# Patient Record
Sex: FEMALE | NOT HISPANIC OR LATINO
[De-identification: names, ages, dates, MRNs, and addresses within clinical notes are randomized per-mention and may not be internally consistent; named-entity substitution may affect disease eponyms.]

---

## 2017-02-10 ENCOUNTER — APPOINTMENT (OUTPATIENT)
Dept: BREAST CENTER | Facility: CLINIC | Age: 62
End: 2017-02-10

## 2017-02-10 VITALS
SYSTOLIC BLOOD PRESSURE: 132 MMHG | BODY MASS INDEX: 40.75 KG/M2 | WEIGHT: 230 LBS | HEART RATE: 74 BPM | DIASTOLIC BLOOD PRESSURE: 80 MMHG | HEIGHT: 63 IN

## 2017-05-10 ENCOUNTER — APPOINTMENT (OUTPATIENT)
Dept: HEMATOLOGY ONCOLOGY | Facility: CLINIC | Age: 62
End: 2017-05-10

## 2017-05-10 VITALS
SYSTOLIC BLOOD PRESSURE: 117 MMHG | WEIGHT: 232 LBS | HEIGHT: 63 IN | TEMPERATURE: 98.2 F | DIASTOLIC BLOOD PRESSURE: 84 MMHG | BODY MASS INDEX: 41.11 KG/M2 | HEART RATE: 78 BPM

## 2017-05-10 DIAGNOSIS — Z09 ENCOUNTER FOR FOLLOW-UP EXAMINATION AFTER COMPLETED TREATMENT FOR CONDITIONS OTHER THAN MALIGNANT NEOPLASM: ICD-10-CM

## 2017-05-10 DIAGNOSIS — N64.4 MASTODYNIA: ICD-10-CM

## 2017-05-10 DIAGNOSIS — I10 ESSENTIAL (PRIMARY) HYPERTENSION: ICD-10-CM

## 2017-08-11 ENCOUNTER — APPOINTMENT (OUTPATIENT)
Dept: BREAST CENTER | Facility: CLINIC | Age: 62
End: 2017-08-11
Payer: COMMERCIAL

## 2017-08-11 VITALS
HEIGHT: 63 IN | SYSTOLIC BLOOD PRESSURE: 130 MMHG | BODY MASS INDEX: 40.75 KG/M2 | WEIGHT: 230 LBS | HEART RATE: 72 BPM | DIASTOLIC BLOOD PRESSURE: 94 MMHG

## 2017-08-11 PROCEDURE — 99213 OFFICE O/P EST LOW 20 MIN: CPT

## 2017-08-11 RX ORDER — PREDNISONE 20 MG/1
20 TABLET ORAL
Qty: 10 | Refills: 0 | Status: DISCONTINUED | COMMUNITY
Start: 2017-02-26

## 2017-08-11 RX ORDER — IBUPROFEN 600 MG/1
600 TABLET, FILM COATED ORAL
Qty: 30 | Refills: 0 | Status: DISCONTINUED | COMMUNITY
Start: 2017-07-21

## 2017-08-11 RX ORDER — BUTALBITAL, ACETAMINOPHEN AND CAFFEINE 300; 50; 40 MG/1; MG/1; MG/1
50-300-40 CAPSULE ORAL
Qty: 20 | Refills: 0 | Status: DISCONTINUED | COMMUNITY
Start: 2017-06-20

## 2017-08-11 RX ORDER — CYCLOBENZAPRINE HYDROCHLORIDE 10 MG/1
10 TABLET, FILM COATED ORAL
Qty: 12 | Refills: 0 | Status: DISCONTINUED | COMMUNITY
Start: 2017-07-21

## 2017-08-11 RX ORDER — ALBUTEROL SULFATE 90 UG/1
108 (90 BASE) AEROSOL, METERED RESPIRATORY (INHALATION)
Qty: 18 | Refills: 0 | Status: ACTIVE | COMMUNITY
Start: 2017-02-26

## 2017-08-11 RX ORDER — BENZONATATE 100 MG/1
100 CAPSULE ORAL
Qty: 30 | Refills: 0 | Status: DISCONTINUED | COMMUNITY
Start: 2017-02-26

## 2017-11-15 ENCOUNTER — APPOINTMENT (OUTPATIENT)
Dept: HEMATOLOGY ONCOLOGY | Facility: CLINIC | Age: 62
End: 2017-11-15

## 2017-11-21 ENCOUNTER — LABORATORY RESULT (OUTPATIENT)
Age: 62
End: 2017-11-21

## 2017-11-21 ENCOUNTER — APPOINTMENT (OUTPATIENT)
Dept: HEMATOLOGY ONCOLOGY | Facility: CLINIC | Age: 62
End: 2017-11-21
Payer: COMMERCIAL

## 2017-11-21 VITALS
BODY MASS INDEX: 41.64 KG/M2 | SYSTOLIC BLOOD PRESSURE: 122 MMHG | HEIGHT: 63 IN | DIASTOLIC BLOOD PRESSURE: 84 MMHG | WEIGHT: 235 LBS | TEMPERATURE: 98.1 F | HEART RATE: 80 BPM

## 2017-11-21 LAB
HCT VFR BLD CALC: 37.2 %
HGB BLD-MCNC: 12.2 G/DL
MCHC RBC-ENTMCNC: 30.1 PG
MCHC RBC-ENTMCNC: 32.7 GM/DL
MCV RBC AUTO: 91.9 FL
PLATELET # BLD AUTO: 329 K/UL
RBC # BLD: 4.05 M/UL
RBC # FLD: 12.3 %
WBC # FLD AUTO: 6.7 K/UL

## 2017-11-21 PROCEDURE — 99214 OFFICE O/P EST MOD 30 MIN: CPT | Mod: 25

## 2017-11-21 PROCEDURE — 85025 COMPLETE CBC W/AUTO DIFF WBC: CPT

## 2017-11-21 RX ORDER — ASPIRIN 81 MG/1
81 TABLET, DELAYED RELEASE ORAL DAILY
Refills: 0 | Status: ACTIVE | COMMUNITY
Start: 2017-11-21

## 2017-12-11 LAB
ALBUMIN SERPL ELPH-MCNC: 4.3 G/DL
ALP BLD-CCNC: 93 U/L
ALT SERPL-CCNC: 11 U/L
ANION GAP SERPL CALC-SCNC: 15 MMOL/L
AST SERPL-CCNC: 15 U/L
BILIRUB SERPL-MCNC: 0.3 MG/DL
BUN SERPL-MCNC: 13 MG/DL
CALCIUM SERPL-MCNC: 9.6 MG/DL
CHLORIDE SERPL-SCNC: 95 MMOL/L
CO2 SERPL-SCNC: 29 MMOL/L
CREAT SERPL-MCNC: 0.83 MG/DL
GLUCOSE SERPL-MCNC: 100 MG/DL
POTASSIUM SERPL-SCNC: 4.3 MMOL/L
PROT SERPL-MCNC: 7.6 G/DL
SODIUM SERPL-SCNC: 139 MMOL/L

## 2018-02-02 ENCOUNTER — APPOINTMENT (OUTPATIENT)
Dept: BREAST CENTER | Facility: CLINIC | Age: 63
End: 2018-02-02
Payer: COMMERCIAL

## 2018-02-02 VITALS
DIASTOLIC BLOOD PRESSURE: 76 MMHG | SYSTOLIC BLOOD PRESSURE: 128 MMHG | WEIGHT: 238 LBS | HEART RATE: 82 BPM | HEIGHT: 63 IN | BODY MASS INDEX: 42.17 KG/M2

## 2018-02-02 DIAGNOSIS — M54.6 PAIN IN THORACIC SPINE: ICD-10-CM

## 2018-02-02 PROCEDURE — 99213 OFFICE O/P EST LOW 20 MIN: CPT

## 2018-05-25 ENCOUNTER — APPOINTMENT (OUTPATIENT)
Dept: HEMATOLOGY ONCOLOGY | Facility: CLINIC | Age: 63
End: 2018-05-25
Payer: COMMERCIAL

## 2018-05-25 VITALS
DIASTOLIC BLOOD PRESSURE: 86 MMHG | SYSTOLIC BLOOD PRESSURE: 151 MMHG | HEIGHT: 63 IN | TEMPERATURE: 98 F | WEIGHT: 247.13 LBS | HEART RATE: 69 BPM | BODY MASS INDEX: 43.79 KG/M2

## 2018-05-25 PROCEDURE — 99215 OFFICE O/P EST HI 40 MIN: CPT

## 2018-07-12 ENCOUNTER — RX RENEWAL (OUTPATIENT)
Age: 63
End: 2018-07-12

## 2018-08-03 ENCOUNTER — APPOINTMENT (OUTPATIENT)
Dept: BREAST CENTER | Facility: CLINIC | Age: 63
End: 2018-08-03
Payer: COMMERCIAL

## 2018-08-03 VITALS
WEIGHT: 238 LBS | DIASTOLIC BLOOD PRESSURE: 72 MMHG | BODY MASS INDEX: 42.17 KG/M2 | SYSTOLIC BLOOD PRESSURE: 134 MMHG | HEIGHT: 63 IN | HEART RATE: 76 BPM

## 2018-08-03 DIAGNOSIS — C50.511 MALIGNANT NEOPLASM OF LOWER-OUTER QUADRANT OF RIGHT FEMALE BREAST: ICD-10-CM

## 2018-08-03 PROCEDURE — 36415 COLL VENOUS BLD VENIPUNCTURE: CPT

## 2018-08-03 PROCEDURE — 99213 OFFICE O/P EST LOW 20 MIN: CPT

## 2018-11-16 ENCOUNTER — LABORATORY RESULT (OUTPATIENT)
Age: 63
End: 2018-11-16

## 2018-11-16 ENCOUNTER — APPOINTMENT (OUTPATIENT)
Dept: HEMATOLOGY ONCOLOGY | Facility: CLINIC | Age: 63
End: 2018-11-16
Payer: COMMERCIAL

## 2018-11-16 VITALS
BODY MASS INDEX: 42.7 KG/M2 | TEMPERATURE: 98.1 F | SYSTOLIC BLOOD PRESSURE: 138 MMHG | HEART RATE: 82 BPM | WEIGHT: 241 LBS | DIASTOLIC BLOOD PRESSURE: 82 MMHG | HEIGHT: 63 IN

## 2018-11-16 LAB
HCT VFR BLD CALC: 36.7 %
HGB BLD-MCNC: 11.8 G/DL
MCHC RBC-ENTMCNC: 29.3 PG
MCHC RBC-ENTMCNC: 32.1 GM/DL
MCV RBC AUTO: 91.1 FL
PLATELET # BLD AUTO: 317 K/UL
RBC # BLD: 4.03 M/UL
RBC # FLD: 12.7 %
WBC # FLD AUTO: 7.7 K/UL

## 2018-11-16 PROCEDURE — 99214 OFFICE O/P EST MOD 30 MIN: CPT | Mod: 25

## 2018-11-16 PROCEDURE — 85025 COMPLETE CBC W/AUTO DIFF WBC: CPT

## 2018-11-16 NOTE — REVIEW OF SYSTEMS
[Patient Intake Form Reviewed] : Patient intake form was reviewed [Joint Pain] : joint pain [Negative] : Allergic/Immunologic [Fever] : no fever [Chills] : no chills [Recent Change In Weight] : ~T no recent weight change [FreeTextEntry9] : swelling of the joints hands, feet.

## 2018-11-16 NOTE — HISTORY OF PRESENT ILLNESS
[Disease: _____________________] : Disease: [unfilled] [T: ___] : T[unfilled] [N: ___] : N[unfilled] [AJCC Stage: ____] : AJCC Stage: [unfilled] [0 - No Distress] : Distress Level: 0 [de-identified] : We are following JULIETTE GALLOWAY for a stage IIA breast cancer.\par Patient presented with a self-palpated mass in her right breast\par 2/1/16 - Mammogram/sonogram - lobulated mass with architectural distortion anterior right breast corresponding to palpable lump\par 2/4/16 - Right breast biopsy confirmed IDC.\par 2/29/16 - Lumpectomy and SLND revealed a 2.2cm tumor, 2 SLN's were negative. \par 4/11/16 - 5/2/16 - XRT\par 5/23/16 - started Arimidex [de-identified] : Moderately differentiated infiltrating ductal carcinoma SBR 7/9, lymphovascular space invasion noted [de-identified] : ER 93%, DC 93%, HER-2 neg; Oncotype DX recurrence score 15 (low risk) [de-identified] : The patient denies changes in her family, medical, or social history since her last visit of 5/25/ 18. She has been tolerating the Arimidex well. \par

## 2018-11-16 NOTE — ASSESSMENT
[FreeTextEntry1] : The patient has an ER+, HER2-, Oncotype low risk, Stage IIA breast cancer, s/p lumpectomy, XRT, and on Arimidex since 5/2016.  She is tolerating it well and clinically remains without evidence of disease.  We would like her to remain on hormonal therapy for a minimum of 5 years, or until 5/2021.  At this time we can consider extended adjuvant therapy.  Continue with routine follow-up:\par Mammogram/Sono: 1/2018 - was normal.  Scheduled for mammogram on 2/19. \par GYN: Has not seen in >10 years, recommend follow up. \par Bone Density:  - 12/2017 WNL \par Colonoscopy:  Has been 10 years -states aware and will work on repeating soon. \par Vitamin D deficiency: In 8/2016 level was 12.9.  Started Vit D ~ 5000IU a day.  \par \par PE 6 months. Arimidex renewed. \par \par Jocy Sagastume\par Prisca Case\par Ashli Hicks

## 2018-11-16 NOTE — PHYSICAL EXAM
[Restricted in physically strenuous activity but ambulatory and able to carry out work of a light or sedentary nature] : Status 1- Restricted in physically strenuous activity but ambulatory and able to carry out work of a light or sedentary nature, e.g., light house work, office work [Obese] : obese [Normal] : affect appropriate [de-identified] : morbidly obese [de-identified] : anicteric [de-identified] : no lymphadenopathy [de-identified] : S1S2 RRR, no obvious murmurs [de-identified] : no c/c/e [de-identified] : Right breast surgical scar around nipple dry and intact, no massess palpated bilaterally in the breast or axillas.  [de-identified] : no rashes

## 2018-11-19 LAB
ALBUMIN SERPL ELPH-MCNC: 4.1 G/DL
ALP BLD-CCNC: 96 U/L
ALT SERPL-CCNC: 10 U/L
ANION GAP SERPL CALC-SCNC: 12 MMOL/L
AST SERPL-CCNC: 10 U/L
BILIRUB SERPL-MCNC: 0.2 MG/DL
BUN SERPL-MCNC: 13 MG/DL
CALCIUM SERPL-MCNC: 9.2 MG/DL
CHLORIDE SERPL-SCNC: 101 MMOL/L
CO2 SERPL-SCNC: 29 MMOL/L
CREAT SERPL-MCNC: 0.68 MG/DL
GLUCOSE SERPL-MCNC: 101 MG/DL
POTASSIUM SERPL-SCNC: 4.1 MMOL/L
PROT SERPL-MCNC: 7 G/DL
SODIUM SERPL-SCNC: 142 MMOL/L

## 2019-01-31 NOTE — CONSULT LETTER
[Dear  ___] : Dear  [unfilled], [Courtesy Letter:] : I had the pleasure of seeing your patient, [unfilled], in my office today. [Sincerely,] : Sincerely, [DrZaina  ___] : Dr. DEE [___] : [unfilled]

## 2019-02-01 ENCOUNTER — APPOINTMENT (OUTPATIENT)
Dept: BREAST CENTER | Facility: CLINIC | Age: 64
End: 2019-02-01
Payer: COMMERCIAL

## 2019-02-01 VITALS
DIASTOLIC BLOOD PRESSURE: 94 MMHG | BODY MASS INDEX: 42.7 KG/M2 | WEIGHT: 241 LBS | HEART RATE: 76 BPM | SYSTOLIC BLOOD PRESSURE: 140 MMHG | HEIGHT: 63 IN

## 2019-02-01 PROCEDURE — 99213 OFFICE O/P EST LOW 20 MIN: CPT

## 2019-02-01 RX ORDER — AMLODIPINE BESYLATE 5 MG/1
5 TABLET ORAL DAILY
Refills: 0 | Status: DISCONTINUED | COMMUNITY
Start: 2018-05-25 | End: 2019-02-01

## 2019-02-01 NOTE — PAST MEDICAL HISTORY
[Menarche Age ____] : age at menarche was [unfilled] [Menopause Age____] : age at menopause was [unfilled] [Total Preg ___] : G[unfilled] [FreeTextEntry7] : 2-3 years

## 2019-02-01 NOTE — PHYSICAL EXAM
[Sclera nonicteric] : sclera nonicteric [Supple] : supple [No Supraclavicular Adenopathy] : no supraclavicular adenopathy [No Cervical Adenopathy] : no cervical adenopathy [No Thyromegaly] : no thyromegaly [Clear to Auscultation Bilat] : clear to auscultation bilaterally [Examined in the supine and seated position] : examined in the supine and seated position [No dominant masses] : no dominant masses in right breast  [No dominant masses] : no dominant masses left breast [No Nipple Retraction] : no left nipple retraction [No Nipple Discharge] : no left nipple discharge [No Axillary Lymphadenopathy] : no left axillary lymphadenopathy [Soft] : abdomen soft [Not Tender] : non-tender [de-identified] : Scar 10-11:00 circumareolar. [de-identified] : Axillary scar.

## 2019-02-01 NOTE — HISTORY OF PRESENT ILLNESS
[FreeTextEntry1] : This is a 63 year old  female who felt a lump in her right breast in 2016. A mammogram and ultrasound showed a suspicious lump and  biopsy confirmed cancer.\par \par She underwent a right breast lumpectomy with sentinel node biopsy in 2/2016 for a 2.2 cm ductal cancer, SLN negx2, ER+IL+Her2 neg tumor. Oncotype 15 (10%). She completed 4 weeks of radiation with boost (Dr. Case) and is on Arimidex (Dr. Obregon,then Dr. Henry).\par \par \par She has no current complaints and has not had any change in her medical history.\par \par

## 2019-02-01 NOTE — DATA REVIEWED
[FreeTextEntry1] : Bilateral mammogram with sreedhar 1/31/2019:  No suspicious abnormality..\par \par \par Bilateral breast ultrasound 1/31/2019:  No sonographic evidence of malignancy.\par \par (Images reviewed on the [facility] portal.)

## 2019-04-19 ENCOUNTER — TRANSCRIPTION ENCOUNTER (OUTPATIENT)
Age: 64
End: 2019-04-19

## 2019-05-10 ENCOUNTER — LABORATORY RESULT (OUTPATIENT)
Age: 64
End: 2019-05-10

## 2019-05-10 ENCOUNTER — APPOINTMENT (OUTPATIENT)
Dept: HEMATOLOGY ONCOLOGY | Facility: CLINIC | Age: 64
End: 2019-05-10
Payer: COMMERCIAL

## 2019-05-10 VITALS
BODY MASS INDEX: 42.52 KG/M2 | TEMPERATURE: 98.3 F | DIASTOLIC BLOOD PRESSURE: 79 MMHG | HEIGHT: 63 IN | WEIGHT: 240 LBS | SYSTOLIC BLOOD PRESSURE: 118 MMHG | HEART RATE: 83 BPM

## 2019-05-10 LAB
HCT VFR BLD CALC: 34.3 %
HGB BLD-MCNC: 11.5 G/DL
MCHC RBC-ENTMCNC: 28.8 PG
MCHC RBC-ENTMCNC: 33.5 GM/DL
MCV RBC AUTO: 85.9 FL
PLATELET # BLD AUTO: 238 K/UL
RBC # BLD: 3.99 M/UL
RBC # FLD: 12 %
WBC # FLD AUTO: 3.2 K/UL

## 2019-05-10 PROCEDURE — XXXXX: CPT

## 2019-05-10 NOTE — HISTORY OF PRESENT ILLNESS
[Disease: _____________________] : Disease: [unfilled] [N: ___] : N[unfilled] [T: ___] : T[unfilled] [M: ___] : M[unfilled] [AJCC Stage: ____] : AJCC Stage: [unfilled] [de-identified] : 63 F with stage II ER/MO+, Her 2 negative right breast cancer since 2016, on HRT.\par \par CASE SYNOPSIS:\par 10/2008 – left breast biopsy for calcifications; pathology consistent with florid duct hyperplasia with columnar changes, and calcifications, apocrine cysts, and dilated ducts with fibrosed walls.\par \par 2/1/16 – reports palpable right breast lump. \par Mammogram/sonogram: lobulated mass with architectural distortion right breast.\par \par 2/4/16- right breast biopsy: infiltrating ductal carcinoma.\par \par 2/29/16 – lumpectomy and SLND: 2.2 cm moderately differentiated infiltrating ductal carcinoma, SBR 7/9, LVI+; Oncotype Dx: 15( 10%).\par \par 4/11 – 5/2/16 –completes XRT right breast\par \par 5/23/16 – present: on adjuvant endocrine therapy with anastrozole.\par \par 12/2017- bone density scan: WNL\par \par 1/31/19- mammogram ( [facility]) – no abnormality.\par  [de-identified] : Moderately differentiated infiltrating ductal carcinoma [de-identified] : ER/MD+, Her 2 negative  [FreeTextEntry1] : anastrozole. [de-identified] : Last seen in office in November 2018; she is compliant with medication, with minimal side effects. Continues regular follow up with Dr. Chrissy Johnson- last visit in February 2019. Reports no new B symptoms, changes in medication, arthralgia, etc. Last mammogram (1/31/19) showed no breast abnormality.

## 2019-05-10 NOTE — ASSESSMENT
[FreeTextEntry1] : Ms. GALLOWAY 's questions were answered to her satisfaction. She expressed her understanding and willingness to comply with the above recommendations, and  will return to the office in 6 months.\par

## 2019-05-10 NOTE — PHYSICAL EXAM
[Fully active, able to carry on all pre-disease performance without restriction] : Status 0 - Fully active, able to carry on all pre-disease performance without restriction [Normal] : normal appearance, no rash, nodules, vesicles, ulcers, erythema [de-identified] : No dominant masses no nipple retraction or discharge bilaterally; right breast circumareolar scar well healed.

## 2019-05-12 LAB
25(OH)D3 SERPL-MCNC: 10.5 NG/ML
ALBUMIN SERPL ELPH-MCNC: 4 G/DL
ALP BLD-CCNC: 72 U/L
ALT SERPL-CCNC: 17 U/L
ANION GAP SERPL CALC-SCNC: 13 MMOL/L
AST SERPL-CCNC: 31 U/L
BILIRUB SERPL-MCNC: 0.3 MG/DL
BUN SERPL-MCNC: 10 MG/DL
CALCIUM SERPL-MCNC: 8.8 MG/DL
CANCER AG27-29 SERPL-ACNC: 19 U/ML
CHLORIDE SERPL-SCNC: 91 MMOL/L
CO2 SERPL-SCNC: 30 MMOL/L
CREAT SERPL-MCNC: 0.73 MG/DL
GLUCOSE SERPL-MCNC: 102 MG/DL
POTASSIUM SERPL-SCNC: 3.7 MMOL/L
PROT SERPL-MCNC: 7.1 G/DL
SODIUM SERPL-SCNC: 134 MMOL/L

## 2019-07-05 ENCOUNTER — RX RENEWAL (OUTPATIENT)
Age: 64
End: 2019-07-05

## 2019-11-13 LAB
BASOPHILS # BLD AUTO: 0.04 K/UL
BASOPHILS NFR BLD AUTO: 0.6 %
EOSINOPHIL # BLD AUTO: 0.28 K/UL
EOSINOPHIL NFR BLD AUTO: 4.5 %
HCT VFR BLD CALC: 39 %
HGB BLD-MCNC: 11.9 G/DL
IMM GRANULOCYTES NFR BLD AUTO: 0.2 %
LYMPHOCYTES # BLD AUTO: 1.55 K/UL
LYMPHOCYTES NFR BLD AUTO: 25 %
MAN DIFF?: NORMAL
MCHC RBC-ENTMCNC: 28.3 PG
MCHC RBC-ENTMCNC: 30.5 GM/DL
MCV RBC AUTO: 92.6 FL
MONOCYTES # BLD AUTO: 0.54 K/UL
MONOCYTES NFR BLD AUTO: 8.7 %
NEUTROPHILS # BLD AUTO: 3.79 K/UL
NEUTROPHILS NFR BLD AUTO: 61 %
PLATELET # BLD AUTO: 319 K/UL
RBC # BLD: 4.21 M/UL
RBC # FLD: 13.6 %
WBC # FLD AUTO: 6.21 K/UL

## 2019-11-14 LAB
25(OH)D3 SERPL-MCNC: 29.1 NG/ML
ALBUMIN SERPL ELPH-MCNC: 4.3 G/DL
ALP BLD-CCNC: 80 U/L
ALT SERPL-CCNC: 14 U/L
ANION GAP SERPL CALC-SCNC: 13 MMOL/L
AST SERPL-CCNC: 14 U/L
BILIRUB SERPL-MCNC: 0.2 MG/DL
BUN SERPL-MCNC: 8 MG/DL
CALCIUM SERPL-MCNC: 9.4 MG/DL
CANCER AG27-29 SERPL-ACNC: 17.8 U/ML
CHLORIDE SERPL-SCNC: 103 MMOL/L
CO2 SERPL-SCNC: 27 MMOL/L
CREAT SERPL-MCNC: 0.73 MG/DL
GLUCOSE SERPL-MCNC: 97 MG/DL
POTASSIUM SERPL-SCNC: 4.2 MMOL/L
PROT SERPL-MCNC: 6.9 G/DL
SODIUM SERPL-SCNC: 143 MMOL/L

## 2019-11-15 ENCOUNTER — APPOINTMENT (OUTPATIENT)
Age: 64
End: 2019-11-15
Payer: COMMERCIAL

## 2019-11-15 VITALS
HEART RATE: 67 BPM | SYSTOLIC BLOOD PRESSURE: 145 MMHG | RESPIRATION RATE: 12 BRPM | TEMPERATURE: 98.2 F | DIASTOLIC BLOOD PRESSURE: 82 MMHG | BODY MASS INDEX: 42.7 KG/M2 | HEIGHT: 63 IN | WEIGHT: 241 LBS

## 2019-11-15 PROCEDURE — 99214 OFFICE O/P EST MOD 30 MIN: CPT

## 2019-11-15 RX ORDER — ELECTROLYTES/DEXTROSE
SOLUTION, ORAL ORAL DAILY
Refills: 0 | Status: ACTIVE | COMMUNITY
Start: 2019-11-15

## 2019-11-15 RX ORDER — ATORVASTATIN CALCIUM 10 MG/1
10 TABLET, FILM COATED ORAL
Refills: 0 | Status: DISCONTINUED | COMMUNITY
Start: 2019-05-10 | End: 2019-11-15

## 2019-11-15 NOTE — HISTORY OF PRESENT ILLNESS
[Disease: _____________________] : Disease: [unfilled] [T: ___] : T[unfilled] [N: ___] : N[unfilled] [M: ___] : M[unfilled] [AJCC Stage: ____] : AJCC Stage: [unfilled] [de-identified] : 64 F with stage II ER/KS+, Her 2 negative right breast cancer since 2016, on HRT.\par \par CASE SYNOPSIS:\par 10/2008 – left breast biopsy for calcifications; pathology consistent with florid duct hyperplasia with columnar changes, and calcifications, apocrine cysts, and dilated ducts with fibrosed walls.\par \par 2/1/16 – reports palpable right breast lump. \par Mammogram/sonogram: lobulated mass with architectural distortion right breast.\par \par 2/4/16- right breast biopsy: infiltrating ductal carcinoma.\par \par 2/29/16 – lumpectomy and SLND: 2.2 cm moderately differentiated infiltrating ductal carcinoma, SBR 7/9, LVI+; Oncotype Dx: 15( 10%).\par \par 4/11 – 5/2/16 –completes XRT right breast\par \par 5/23/16 – present: on adjuvant endocrine therapy with anastrozole.\par \par 12/2017- bone density scan: WNL\par \par 1/31/19- mammogram ( [facility]) – no abnormality.\par  [de-identified] : Moderately differentiated infiltrating ductal carcinoma [de-identified] : ER/WY+, Her 2 negative  [FreeTextEntry1] : anastrozole. [de-identified] : Last seen in the office in May 2019, patient is returning for biannual followup. She has been compliant with anastrozole with no significant side effects are reported. Her last mammogram was in January 2019 (reviewed). Patient due for bone density study this December. She is active, denies falls or fractures. She is occasionally complaining of arthralgia. Her WBC has recovered (was 3.2 K. in May 2019), and overall hematologic picture is in normal range. Serum tumor marker CA 27-29 also in normal range.

## 2019-11-15 NOTE — PHYSICAL EXAM
[Fully active, able to carry on all pre-disease performance without restriction] : Status 0 - Fully active, able to carry on all pre-disease performance without restriction [Normal] : normal appearance, no rash, nodules, vesicles, ulcers, erythema [de-identified] : No dominant masses no nipple retraction or discharge bilaterally; right breast circumareolar scar well healed.

## 2020-02-07 ENCOUNTER — APPOINTMENT (OUTPATIENT)
Dept: BREAST CENTER | Facility: CLINIC | Age: 65
End: 2020-02-07
Payer: COMMERCIAL

## 2020-02-07 VITALS
HEART RATE: 70 BPM | HEIGHT: 63 IN | BODY MASS INDEX: 42.52 KG/M2 | DIASTOLIC BLOOD PRESSURE: 82 MMHG | SYSTOLIC BLOOD PRESSURE: 140 MMHG | WEIGHT: 240 LBS

## 2020-02-07 PROCEDURE — 99213 OFFICE O/P EST LOW 20 MIN: CPT

## 2020-02-07 NOTE — DATA REVIEWED
[FreeTextEntry1] : Bilateral mammogram with sreedhar 2/1/2020:  No suspicious abnormality..\par \par \par Bilateral breast ultrasound 2/1/2020:  No sonographic evidence of malignancy.\par \par

## 2020-02-07 NOTE — HISTORY OF PRESENT ILLNESS
[FreeTextEntry1] : This is a 64 year old  female who felt a lump in her right breast in 2016. A mammogram and ultrasound showed a suspicious lump and  biopsy confirmed cancer.\par \par She underwent a right breast lumpectomy with sentinel node biopsy in 2/2016 for a 2.2 cm ductal cancer, SLN negx2, ER+CO+Her2 neg tumor. Oncotype 15 (10%). She completed 4 weeks of radiation with boost (Dr. Case) and is on Arimidex (Dr. Obregon,then Dr. Henry, now Dr. Snyder).\par \par \par She has no current complaints and has not had any change in her medical history.\par \par

## 2020-02-07 NOTE — PHYSICAL EXAM
[Sclera nonicteric] : sclera nonicteric [Supple] : supple [No Supraclavicular Adenopathy] : no supraclavicular adenopathy [No Cervical Adenopathy] : no cervical adenopathy [No Thyromegaly] : no thyromegaly [Clear to Auscultation Bilat] : clear to auscultation bilaterally [Examined in the supine and seated position] : examined in the supine and seated position [No dominant masses] : no dominant masses in right breast  [No dominant masses] : no dominant masses left breast [No Nipple Retraction] : no left nipple retraction [No Nipple Discharge] : no left nipple discharge [No Axillary Lymphadenopathy] : no left axillary lymphadenopathy [Soft] : abdomen soft [Not Tender] : non-tender [de-identified] : Scar 10-11:00 circumareolar. [de-identified] : Axillary scar.

## 2020-04-24 ENCOUNTER — OUTPATIENT (OUTPATIENT)
Dept: OUTPATIENT SERVICES | Facility: HOSPITAL | Age: 65
LOS: 1 days | Discharge: HOME | End: 2020-04-24

## 2020-04-24 DIAGNOSIS — C50.911 MALIGNANT NEOPLASM OF UNSPECIFIED SITE OF RIGHT FEMALE BREAST: ICD-10-CM

## 2020-05-01 ENCOUNTER — APPOINTMENT (OUTPATIENT)
Age: 65
End: 2020-05-01

## 2020-07-27 ENCOUNTER — OUTPATIENT (OUTPATIENT)
Dept: OUTPATIENT SERVICES | Facility: HOSPITAL | Age: 65
LOS: 1 days | Discharge: HOME | End: 2020-07-27

## 2020-07-27 DIAGNOSIS — C50.911 MALIGNANT NEOPLASM OF UNSPECIFIED SITE OF RIGHT FEMALE BREAST: ICD-10-CM

## 2020-07-31 ENCOUNTER — RESULT REVIEW (OUTPATIENT)
Age: 65
End: 2020-07-31

## 2020-07-31 ENCOUNTER — APPOINTMENT (OUTPATIENT)
Age: 65
End: 2020-07-31
Payer: COMMERCIAL

## 2020-07-31 VITALS
BODY MASS INDEX: 43.41 KG/M2 | WEIGHT: 245 LBS | SYSTOLIC BLOOD PRESSURE: 147 MMHG | HEART RATE: 74 BPM | TEMPERATURE: 97.6 F | DIASTOLIC BLOOD PRESSURE: 87 MMHG | RESPIRATION RATE: 14 BRPM | HEIGHT: 63 IN

## 2020-07-31 VITALS — SYSTOLIC BLOOD PRESSURE: 147 MMHG | WEIGHT: 245 LBS | BODY MASS INDEX: 43.4 KG/M2 | DIASTOLIC BLOOD PRESSURE: 87 MMHG

## 2020-07-31 LAB
24R-OH-CALCIDIOL SERPL-MCNC: 30.6 NG/ML — SIGNIFICANT CHANGE UP (ref 30–80)
ALBUMIN SERPL ELPH-MCNC: 4.5 G/DL — SIGNIFICANT CHANGE UP (ref 3.3–5)
ALP SERPL-CCNC: 81 U/L — SIGNIFICANT CHANGE UP (ref 40–120)
ALT FLD-CCNC: 16 U/L — SIGNIFICANT CHANGE UP (ref 10–45)
ANION GAP SERPL CALC-SCNC: 13 MMOL/L — SIGNIFICANT CHANGE UP (ref 5–17)
AST SERPL-CCNC: 21 U/L — SIGNIFICANT CHANGE UP (ref 10–40)
BASOPHILS # BLD AUTO: 0.03 K/UL — SIGNIFICANT CHANGE UP (ref 0–0.2)
BASOPHILS NFR BLD AUTO: 0.5 % — SIGNIFICANT CHANGE UP (ref 0–2)
BILIRUB SERPL-MCNC: 0.4 MG/DL — SIGNIFICANT CHANGE UP (ref 0.2–1.2)
BUN SERPL-MCNC: 12 MG/DL — SIGNIFICANT CHANGE UP (ref 7–23)
CALCIUM SERPL-MCNC: 9.5 MG/DL — SIGNIFICANT CHANGE UP (ref 8.4–10.5)
CHLORIDE SERPL-SCNC: 99 MMOL/L — SIGNIFICANT CHANGE UP (ref 96–108)
CO2 SERPL-SCNC: 28 MMOL/L — SIGNIFICANT CHANGE UP (ref 22–31)
CREAT SERPL-MCNC: 0.69 MG/DL — SIGNIFICANT CHANGE UP (ref 0.5–1.3)
EOSINOPHIL # BLD AUTO: 0.24 K/UL — SIGNIFICANT CHANGE UP (ref 0–0.5)
EOSINOPHIL NFR BLD AUTO: 3.7 % — SIGNIFICANT CHANGE UP (ref 0–6)
GLUCOSE SERPL-MCNC: 102 MG/DL — HIGH (ref 70–99)
HCT VFR BLD CALC: 37 % — SIGNIFICANT CHANGE UP (ref 34.5–45)
HGB BLD-MCNC: 11.9 G/DL — SIGNIFICANT CHANGE UP (ref 11.5–15.5)
IMM GRANULOCYTES NFR BLD AUTO: 0.3 % — SIGNIFICANT CHANGE UP (ref 0–1.5)
LYMPHOCYTES # BLD AUTO: 1.33 K/UL — SIGNIFICANT CHANGE UP (ref 1–3.3)
LYMPHOCYTES # BLD AUTO: 20.6 % — SIGNIFICANT CHANGE UP (ref 13–44)
MCHC RBC-ENTMCNC: 29.1 PG — SIGNIFICANT CHANGE UP (ref 27–34)
MCHC RBC-ENTMCNC: 32.2 GM/DL — SIGNIFICANT CHANGE UP (ref 32–36)
MCV RBC AUTO: 90.5 FL — SIGNIFICANT CHANGE UP (ref 80–100)
MONOCYTES # BLD AUTO: 0.6 K/UL — SIGNIFICANT CHANGE UP (ref 0–0.9)
MONOCYTES NFR BLD AUTO: 9.3 % — SIGNIFICANT CHANGE UP (ref 2–14)
NEUTROPHILS # BLD AUTO: 4.24 K/UL — SIGNIFICANT CHANGE UP (ref 1.8–7.4)
NEUTROPHILS NFR BLD AUTO: 65.6 % — SIGNIFICANT CHANGE UP (ref 43–77)
NRBC # BLD: 0 /100 WBCS — SIGNIFICANT CHANGE UP (ref 0–0)
PLATELET # BLD AUTO: 288 K/UL — SIGNIFICANT CHANGE UP (ref 150–400)
POTASSIUM SERPL-MCNC: 4.8 MMOL/L — SIGNIFICANT CHANGE UP (ref 3.5–5.3)
POTASSIUM SERPL-SCNC: 4.8 MMOL/L — SIGNIFICANT CHANGE UP (ref 3.5–5.3)
PROT SERPL-MCNC: 7 G/DL — SIGNIFICANT CHANGE UP (ref 6–8.3)
RBC # BLD: 4.09 M/UL — SIGNIFICANT CHANGE UP (ref 3.8–5.2)
RBC # FLD: 13.2 % — SIGNIFICANT CHANGE UP (ref 10.3–14.5)
SODIUM SERPL-SCNC: 141 MMOL/L — SIGNIFICANT CHANGE UP (ref 135–145)
WBC # BLD: 6.46 K/UL — SIGNIFICANT CHANGE UP (ref 3.8–10.5)
WBC # FLD AUTO: 6.46 K/UL — SIGNIFICANT CHANGE UP (ref 3.8–10.5)

## 2020-07-31 PROCEDURE — 99214 OFFICE O/P EST MOD 30 MIN: CPT

## 2020-07-31 RX ORDER — ATORVASTATIN CALCIUM 40 MG/1
40 TABLET, FILM COATED ORAL
Refills: 0 | Status: DISCONTINUED | COMMUNITY
Start: 2019-11-15 | End: 2020-07-31

## 2020-07-31 NOTE — HISTORY OF PRESENT ILLNESS
[Disease: _____________________] : Disease: [unfilled] [T: ___] : T[unfilled] [N: ___] : N[unfilled] [M: ___] : M[unfilled] [AJCC Stage: ____] : AJCC Stage: [unfilled] [de-identified] : Moderately differentiated infiltrating ductal carcinoma [de-identified] : 65 F with stage II ER/WY+, Her 2 negative right breast cancer since 2016, on HRT.\par \par CASE SYNOPSIS:\par 10/2008 – left breast biopsy for calcifications; pathology consistent with florid duct hyperplasia with columnar changes, and calcifications, apocrine cysts, and dilated ducts with fibrosed walls.\par \par 2/1/16 – reports palpable right breast lump. \par Mammogram/sonogram: lobulated mass with architectural distortion right breast.\par \par 2/4/16- right breast biopsy: infiltrating ductal carcinoma.\par \par 2/29/16 – lumpectomy and SLND: 2.2 cm moderately differentiated infiltrating ductal carcinoma, SBR 7/9, LVI+; Oncotype Dx: 15( 10%).\par \par 4/11 – 5/2/16 –completes XRT right breast\par \par 5/23/16 – present: on adjuvant endocrine therapy with anastrozole.\par \par 12/2017- bone density scan: WNL\par \par 1/31/19- mammogram ( [facility]) – no abnormality.\par \par 2/1/2020: annual mammogram/breast ultrasound–no suspicious mammographic/ sonographic abnormality noted. \par  [de-identified] : ER/WV+, Her 2 negative  [FreeTextEntry1] : anastrozole. [de-identified] : Returning for biannual oncologic follow up, 6 months after last office visit.Ms. GALLOWAY is doing well, compliant with adjuvant hormonal treatment (Anastrozole) since May 2016. Endorses minimal musculoskeletal side effects related to medication. Last mammogram/ breast ultrasound (2/1/20 ) consistent with no suspicious mammographic/ sonographic abnormality. Laboratory tests consistent with normal CBC; pending serum tumor marker (CA 27-29). Cataract surgery pending this year.

## 2020-07-31 NOTE — PHYSICAL EXAM
[Fully active, able to carry on all pre-disease performance without restriction] : Status 0 - Fully active, able to carry on all pre-disease performance without restriction [Normal] : normal appearance, no rash, nodules, vesicles, ulcers, erythema [de-identified] : No dominant masses no nipple retraction or discharge bilaterally; right breast circumareolar scar well healed.

## 2020-08-03 LAB — CANCER AG27-29 SERPL-ACNC: 17.5 U/ML — SIGNIFICANT CHANGE UP (ref 0–38.6)

## 2020-11-04 ENCOUNTER — APPOINTMENT (OUTPATIENT)
Dept: BREAST CENTER | Facility: CLINIC | Age: 65
End: 2020-11-04
Payer: COMMERCIAL

## 2020-11-04 VITALS
HEIGHT: 63 IN | DIASTOLIC BLOOD PRESSURE: 93 MMHG | BODY MASS INDEX: 43.41 KG/M2 | SYSTOLIC BLOOD PRESSURE: 153 MMHG | WEIGHT: 245 LBS | HEART RATE: 86 BPM

## 2020-11-04 PROCEDURE — 99213 OFFICE O/P EST LOW 20 MIN: CPT

## 2020-11-04 PROCEDURE — 99072 ADDL SUPL MATRL&STAF TM PHE: CPT

## 2020-11-04 RX ORDER — LOSARTAN POTASSIUM AND HYDROCHLOROTHIAZIDE 100; 12.5 MG/1; MG/1
100-12.5 TABLET, FILM COATED ORAL
Refills: 0 | Status: ACTIVE | COMMUNITY
Start: 2019-11-15

## 2020-11-04 NOTE — PHYSICAL EXAM
[Sclera nonicteric] : sclera nonicteric [Supple] : supple [No Supraclavicular Adenopathy] : no supraclavicular adenopathy [No Cervical Adenopathy] : no cervical adenopathy [No Thyromegaly] : no thyromegaly [Clear to Auscultation Bilat] : clear to auscultation bilaterally [Examined in the supine and seated position] : examined in the supine and seated position [No dominant masses] : no dominant masses in right breast  [No dominant masses] : no dominant masses left breast [No Nipple Retraction] : no left nipple retraction [No Nipple Discharge] : no left nipple discharge [No Axillary Lymphadenopathy] : no left axillary lymphadenopathy [Soft] : abdomen soft [Not Tender] : non-tender [de-identified] : Scar 10-11:00 circumareolar. Area of concern 3:00 at areolar edge in sitting position with breast being held. [de-identified] : Axillary scar.

## 2020-11-04 NOTE — HISTORY OF PRESENT ILLNESS
[FreeTextEntry1] : This is a 65 year old  female who felt a lump in her right breast in 2016. A mammogram and ultrasound showed a suspicious lump and  biopsy confirmed cancer.\par \par She underwent a right breast lumpectomy with sentinel node biopsy in 2/2016 for a 2.2 cm ductal cancer, SLN negx2, ER+IL+Her2 neg tumor. Oncotype 15 (10%). She completed 4 weeks of radiation with boost (Dr. Case) and is on Arimidex (Dr. Obregon,then Dr. Henry, now Dr. Snyder).\par \par \par She thought she felt a hard pea like mass in her right breast while lying down 3 days ago.  She is having trouble feeling it today. She does regular BSE and hadn't felt anything like that before.\par \par

## 2020-11-13 ENCOUNTER — RX RENEWAL (OUTPATIENT)
Age: 65
End: 2020-11-13

## 2021-02-05 ENCOUNTER — APPOINTMENT (OUTPATIENT)
Dept: BREAST CENTER | Facility: CLINIC | Age: 66
End: 2021-02-05
Payer: COMMERCIAL

## 2021-02-05 ENCOUNTER — LABORATORY RESULT (OUTPATIENT)
Age: 66
End: 2021-02-05

## 2021-02-05 VITALS
DIASTOLIC BLOOD PRESSURE: 86 MMHG | HEIGHT: 63 IN | HEART RATE: 77 BPM | BODY MASS INDEX: 43.41 KG/M2 | WEIGHT: 245 LBS | SYSTOLIC BLOOD PRESSURE: 166 MMHG

## 2021-02-05 PROCEDURE — 99213 OFFICE O/P EST LOW 20 MIN: CPT

## 2021-02-05 PROCEDURE — 10005 FNA BX W/US GDN 1ST LES: CPT

## 2021-02-05 PROCEDURE — 99072 ADDL SUPL MATRL&STAF TM PHE: CPT

## 2021-02-05 NOTE — PHYSICAL EXAM
[Sclera nonicteric] : sclera nonicteric [Supple] : supple [No Supraclavicular Adenopathy] : no supraclavicular adenopathy [No Cervical Adenopathy] : no cervical adenopathy [No Thyromegaly] : no thyromegaly [Clear to Auscultation Bilat] : clear to auscultation bilaterally [Examined in the supine and seated position] : examined in the supine and seated position [No dominant masses] : no dominant masses in right breast  [No dominant masses] : no dominant masses left breast [No Nipple Retraction] : no left nipple retraction [No Nipple Discharge] : no left nipple discharge [No Axillary Lymphadenopathy] : no left axillary lymphadenopathy [Soft] : abdomen soft [Not Tender] : non-tender [de-identified] : Scar 10-11:00 circumareolar.  [de-identified] : Axillary scar.

## 2021-02-05 NOTE — DATA REVIEWED
[FreeTextEntry1] : Bilateral mammogram 2/3/2021:  No suspicious abnormality..\par \par \par Bilateral breast ultrasound 2/3/2021:  New superficial 4 mm complicated cyst right 10:00 N4. Follow-up in 6 months.\par \par (Images reviewed on the [facility] portal.)\par \par

## 2021-02-05 NOTE — PROCEDURE
[FreeTextEntry1] : Right breast ultrasound guided aspiration [FreeTextEntry2] : Assess new lesion [FreeTextEntry3] : The right 10:00 N4 was imaged and shows a 4x4x4 mm hypoechoic lesion with slight posterior shadowing.  An ultrasound guided aspiration was performed with partial collapse.  A cytologic smear was made.

## 2021-02-05 NOTE — HISTORY OF PRESENT ILLNESS
[FreeTextEntry1] : This is a 65 year old  female who felt a lump in her right breast in 2016. A mammogram and ultrasound showed a suspicious lump and  biopsy confirmed cancer.\par \par She underwent a right breast lumpectomy with sentinel node biopsy in 2/2016 for a 2.2 cm ductal cancer, SLN negx2, ER+OR+Her2 neg tumor. Oncotype 15 (10%). She completed 4 weeks of radiation with boost (Dr. Case) and is on Arimidex (Dr. Obregon,then Dr. Henry, now Dr. Snyder).\par \par \par She has no current complaints.  A six month follow-up was advised for a finding on her right breast ultrasound.\par

## 2021-02-11 ENCOUNTER — OUTPATIENT (OUTPATIENT)
Dept: OUTPATIENT SERVICES | Facility: HOSPITAL | Age: 66
LOS: 1 days | Discharge: HOME | End: 2021-02-11

## 2021-02-11 DIAGNOSIS — C50.911 MALIGNANT NEOPLASM OF UNSPECIFIED SITE OF RIGHT FEMALE BREAST: ICD-10-CM

## 2021-03-31 ENCOUNTER — OUTPATIENT (OUTPATIENT)
Dept: OUTPATIENT SERVICES | Facility: HOSPITAL | Age: 66
LOS: 1 days | Discharge: HOME | End: 2021-03-31

## 2021-03-31 DIAGNOSIS — C50.911 MALIGNANT NEOPLASM OF UNSPECIFIED SITE OF RIGHT FEMALE BREAST: ICD-10-CM

## 2021-04-09 ENCOUNTER — RESULT REVIEW (OUTPATIENT)
Age: 66
End: 2021-04-09

## 2021-04-09 ENCOUNTER — APPOINTMENT (OUTPATIENT)
Age: 66
End: 2021-04-09
Payer: COMMERCIAL

## 2021-04-09 VITALS
TEMPERATURE: 97.7 F | DIASTOLIC BLOOD PRESSURE: 97 MMHG | HEART RATE: 108 BPM | SYSTOLIC BLOOD PRESSURE: 156 MMHG | WEIGHT: 249.1 LBS | BODY MASS INDEX: 44.13 KG/M2

## 2021-04-09 LAB
24R-OH-CALCIDIOL SERPL-MCNC: 40.9 NG/ML — SIGNIFICANT CHANGE UP (ref 30–80)
ALBUMIN SERPL ELPH-MCNC: 4.3 G/DL — SIGNIFICANT CHANGE UP (ref 3.3–5)
ALP SERPL-CCNC: 93 U/L — SIGNIFICANT CHANGE UP (ref 40–120)
ALT FLD-CCNC: 13 U/L — SIGNIFICANT CHANGE UP (ref 10–45)
ANION GAP SERPL CALC-SCNC: 10 MMOL/L — SIGNIFICANT CHANGE UP (ref 5–17)
AST SERPL-CCNC: 15 U/L — SIGNIFICANT CHANGE UP (ref 10–40)
BASOPHILS # BLD AUTO: 0.04 K/UL — SIGNIFICANT CHANGE UP (ref 0–0.2)
BASOPHILS NFR BLD AUTO: 0.6 % — SIGNIFICANT CHANGE UP (ref 0–2)
BILIRUB SERPL-MCNC: 0.3 MG/DL — SIGNIFICANT CHANGE UP (ref 0.2–1.2)
BUN SERPL-MCNC: 14 MG/DL — SIGNIFICANT CHANGE UP (ref 7–23)
CALCIUM SERPL-MCNC: 9.3 MG/DL — SIGNIFICANT CHANGE UP (ref 8.4–10.5)
CHLORIDE SERPL-SCNC: 100 MMOL/L — SIGNIFICANT CHANGE UP (ref 96–108)
CO2 SERPL-SCNC: 30 MMOL/L — SIGNIFICANT CHANGE UP (ref 22–31)
CREAT SERPL-MCNC: 0.73 MG/DL — SIGNIFICANT CHANGE UP (ref 0.5–1.3)
EOSINOPHIL # BLD AUTO: 0.22 K/UL — SIGNIFICANT CHANGE UP (ref 0–0.5)
EOSINOPHIL NFR BLD AUTO: 3.2 % — SIGNIFICANT CHANGE UP (ref 0–6)
GLUCOSE SERPL-MCNC: 94 MG/DL — SIGNIFICANT CHANGE UP (ref 70–99)
HCT VFR BLD CALC: 35.5 % — SIGNIFICANT CHANGE UP (ref 34.5–45)
HGB BLD-MCNC: 11.7 G/DL — SIGNIFICANT CHANGE UP (ref 11.5–15.5)
IMM GRANULOCYTES NFR BLD AUTO: 0.3 % — SIGNIFICANT CHANGE UP (ref 0–1.5)
LYMPHOCYTES # BLD AUTO: 1.41 K/UL — SIGNIFICANT CHANGE UP (ref 1–3.3)
LYMPHOCYTES # BLD AUTO: 20.6 % — SIGNIFICANT CHANGE UP (ref 13–44)
MCHC RBC-ENTMCNC: 30.2 PG — SIGNIFICANT CHANGE UP (ref 27–34)
MCHC RBC-ENTMCNC: 33 GM/DL — SIGNIFICANT CHANGE UP (ref 32–36)
MCV RBC AUTO: 91.7 FL — SIGNIFICANT CHANGE UP (ref 80–100)
MONOCYTES # BLD AUTO: 0.64 K/UL — SIGNIFICANT CHANGE UP (ref 0–0.9)
MONOCYTES NFR BLD AUTO: 9.4 % — SIGNIFICANT CHANGE UP (ref 2–14)
NEUTROPHILS # BLD AUTO: 4.51 K/UL — SIGNIFICANT CHANGE UP (ref 1.8–7.4)
NEUTROPHILS NFR BLD AUTO: 65.9 % — SIGNIFICANT CHANGE UP (ref 43–77)
NRBC # BLD: 0 /100 WBCS — SIGNIFICANT CHANGE UP (ref 0–0)
PLATELET # BLD AUTO: 301 K/UL — SIGNIFICANT CHANGE UP (ref 150–400)
POTASSIUM SERPL-MCNC: 4.2 MMOL/L — SIGNIFICANT CHANGE UP (ref 3.5–5.3)
POTASSIUM SERPL-SCNC: 4.2 MMOL/L — SIGNIFICANT CHANGE UP (ref 3.5–5.3)
PROT SERPL-MCNC: 7.2 G/DL — SIGNIFICANT CHANGE UP (ref 6–8.3)
RBC # BLD: 3.87 M/UL — SIGNIFICANT CHANGE UP (ref 3.8–5.2)
RBC # FLD: 12.9 % — SIGNIFICANT CHANGE UP (ref 10.3–14.5)
SODIUM SERPL-SCNC: 140 MMOL/L — SIGNIFICANT CHANGE UP (ref 135–145)
WBC # BLD: 6.84 K/UL — SIGNIFICANT CHANGE UP (ref 3.8–10.5)
WBC # FLD AUTO: 6.84 K/UL — SIGNIFICANT CHANGE UP (ref 3.8–10.5)

## 2021-04-09 PROCEDURE — 99214 OFFICE O/P EST MOD 30 MIN: CPT

## 2021-04-09 NOTE — HISTORY OF PRESENT ILLNESS
[Disease: _____________________] : Disease: [unfilled] [T: ___] : T[unfilled] [N: ___] : N[unfilled] [M: ___] : M[unfilled] [AJCC Stage: ____] : AJCC Stage: [unfilled] [de-identified] : 65 F with stage II ER/MI+, Her 2 negative right breast cancer since 2016, on HRT.\par \par CASE SYNOPSIS:\par 10/2008 – left breast biopsy for calcifications; pathology consistent with florid duct hyperplasia with columnar changes, and calcifications, apocrine cysts, and dilated ducts with fibrosed walls.\par \par 2/1/16 – reports palpable right breast lump. \par Mammogram/sonogram: lobulated mass with architectural distortion right breast.\par \par 2/4/16- right breast biopsy: infiltrating ductal carcinoma.\par \par 2/29/16 – lumpectomy and SLND: 2.2 cm moderately differentiated infiltrating ductal carcinoma, SBR 7/9, LVI+; Oncotype Dx: 15( 10%).\par \par 4/11 – 5/2/16 –completes XRT right breast\par \par 5/23/16 – present: on adjuvant endocrine therapy with anastrozole.\par \par 12/2017- bone density scan: WNL\par \par 1/31/19- mammogram ( [facility]) – no abnormality.\par \par 2/1/2020: annual mammogram/breast ultrasound–no suspicious mammographic/ sonographic abnormality noted. \par \par 2/3/2021: Annual mammogram/breast ultrasound–no suspicious mammographic/ sonographic abnormality noted.  [de-identified] : Moderately differentiated infiltrating ductal carcinoma [de-identified] : ER/MS+, Her 2 negative  [FreeTextEntry1] : anastrozole. [de-identified] : Ms. GALLOWAY has been on adjuvant hormonal therapy with aromatase inhibitor (anastrozole) for the past 5 years and she is reported no significant side effects to the medication.  Review of 2/3/2021 breast mammogram and ultrasound show no evidence of recurrent disease.  Patient had an unscheduled breast ultrasound on 11/9/2020, when she palpated a firm nodule in the right breast.  The study and physical exam were unremarkable.She has gained 4 pounds in the past 6-month due to lack of physical activity.  Received vaccination for COVID-19.  Hematologic profile remains unchanged.\par

## 2021-04-09 NOTE — PHYSICAL EXAM
[Fully active, able to carry on all pre-disease performance without restriction] : Status 0 - Fully active, able to carry on all pre-disease performance without restriction [Normal] : normal appearance, no rash, nodules, vesicles, ulcers, erythema [de-identified] : No dominant masses no nipple retraction or discharge bilaterally; right breast circumareolar scar well healed.

## 2021-04-12 LAB — CANCER AG27-29 SERPL-ACNC: 20.3 U/ML — SIGNIFICANT CHANGE UP (ref 0–38.6)

## 2021-08-20 ENCOUNTER — APPOINTMENT (OUTPATIENT)
Dept: BREAST CENTER | Facility: CLINIC | Age: 66
End: 2021-08-20
Payer: COMMERCIAL

## 2021-08-20 VITALS
SYSTOLIC BLOOD PRESSURE: 142 MMHG | WEIGHT: 245 LBS | DIASTOLIC BLOOD PRESSURE: 88 MMHG | BODY MASS INDEX: 43.41 KG/M2 | HEART RATE: 88 BPM | HEIGHT: 63 IN

## 2021-08-20 DIAGNOSIS — R92.8 OTHER ABNORMAL AND INCONCLUSIVE FINDINGS ON DIAGNOSTIC IMAGING OF BREAST: ICD-10-CM

## 2021-08-20 DIAGNOSIS — Z86.19 PERSONAL HISTORY OF OTHER INFECTIOUS AND PARASITIC DISEASES: ICD-10-CM

## 2021-08-20 PROCEDURE — 99213 OFFICE O/P EST LOW 20 MIN: CPT

## 2021-08-20 NOTE — PHYSICAL EXAM
[Sclera nonicteric] : sclera nonicteric [Supple] : supple [No Supraclavicular Adenopathy] : no supraclavicular adenopathy [No Cervical Adenopathy] : no cervical adenopathy [No Thyromegaly] : no thyromegaly [Clear to Auscultation Bilat] : clear to auscultation bilaterally [Examined in the supine and seated position] : examined in the supine and seated position [No dominant masses] : no dominant masses in right breast  [No dominant masses] : no dominant masses left breast [No Nipple Retraction] : no left nipple retraction [No Nipple Discharge] : no left nipple discharge [No Axillary Lymphadenopathy] : no left axillary lymphadenopathy [Soft] : abdomen soft [Not Tender] : non-tender [de-identified] : Scar 10-11:00 circumareolar with mild telangiectasia in skin.  [de-identified] : Axillary scar.

## 2021-08-20 NOTE — HISTORY OF PRESENT ILLNESS
[FreeTextEntry1] : This is a 66 year old  female who felt a lump in her right breast in 2016. A mammogram and ultrasound showed a suspicious lump and  biopsy confirmed cancer.\par \par She underwent a right breast lumpectomy with sentinel node biopsy in 2/2016 for a 2.2 cm ductal cancer, SLN negx2, ER+WA+Her2 neg tumor. Oncotype 15 (10%). She completed 4 weeks of radiation with boost (Dr. Case) and is on Arimidex (Dr. Obregon,then Dr. Henry, now Dr. Snyder).\par \par \par She has no current complaints.  A six month follow-up was advised for a finding on her right breast ultrasound in February for a small hypoechoic lesion.  An FNA in the office was benign.  She forgot to go for the 6 month follow-up ultrasound that was advised.\par

## 2021-08-20 NOTE — DATA REVIEWED
[FreeTextEntry1] : Bilateral mammogram 2/3/2021:  No suspicious abnormality..\par \par \par Bilateral breast ultrasound 2/3/2021:  New superficial 4 mm complicated cyst right 10:00 N4. Follow-up in 6 months.\par \par \par Cytology 2/5/2021:  Right 10= c/w cyst contents.  Negative for malignant cells.

## 2021-10-19 ENCOUNTER — OUTPATIENT (OUTPATIENT)
Dept: OUTPATIENT SERVICES | Facility: HOSPITAL | Age: 66
LOS: 1 days | Discharge: HOME | End: 2021-10-19

## 2021-10-19 DIAGNOSIS — C50.911 MALIGNANT NEOPLASM OF UNSPECIFIED SITE OF RIGHT FEMALE BREAST: ICD-10-CM

## 2021-10-20 ENCOUNTER — APPOINTMENT (OUTPATIENT)
Dept: HEMATOLOGY ONCOLOGY | Facility: CLINIC | Age: 66
End: 2021-10-20
Payer: COMMERCIAL

## 2021-10-20 VITALS
TEMPERATURE: 97.8 F | DIASTOLIC BLOOD PRESSURE: 86 MMHG | HEART RATE: 67 BPM | SYSTOLIC BLOOD PRESSURE: 144 MMHG | WEIGHT: 251 LBS | BODY MASS INDEX: 44.46 KG/M2

## 2021-10-20 PROCEDURE — 99214 OFFICE O/P EST MOD 30 MIN: CPT

## 2021-10-21 DIAGNOSIS — Z08 ENCOUNTER FOR FOLLOW-UP EXAMINATION AFTER COMPLETED TREATMENT FOR MALIGNANT NEOPLASM: ICD-10-CM

## 2021-10-21 DIAGNOSIS — Z51.81 ENCOUNTER FOR THERAPEUTIC DRUG LEVEL MONITORING: ICD-10-CM

## 2021-10-21 NOTE — HISTORY OF PRESENT ILLNESS
[Disease: _____________________] : Disease: [unfilled] [T: ___] : T[unfilled] [N: ___] : N[unfilled] [M: ___] : M[unfilled] [AJCC Stage: ____] : AJCC Stage: [unfilled] [de-identified] : Presented in 2016 with palpable right breast lump. \par Mammogram/sonogram: lobulated mass with architectural distortion right breast.\par \par 2/4/16- right breast biopsy: infiltrating ductal carcinoma.\par \par 2/29/16 – lumpectomy and SLND: ( Dr Carrero)  2.2 cm moderately differentiated infiltrating ductal carcinoma, SBR 7/9, LVI+; Oncotype Dx: 15( 10%).\par \par 4/11 – 5/2/16 –completes XRT right breast ( Dr Case)\par \par 5/23/16 – started  adjuvant endocrine therapy with anastrozole.\par \par Bone density 8/2020 normal \par \par 2/3/2021: Annual mammogram/breast ultrasound–no suspicious mammographic/ sonographic abnormality noted.  [de-identified] : Moderately differentiated infiltrating ductal carcinoma [de-identified] : ER/SD+, Her 2 negative  [de-identified] : Feels well , has no complaints. Tolerating anastrozole without any side effects.

## 2021-10-21 NOTE — REASON FOR VISIT
[Follow-Up Visit] : a follow-up [FreeTextEntry2] : right breast infiltrating ductal carcinoma on adjuvant anastrozole

## 2021-10-21 NOTE — PHYSICAL EXAM
[Fully active, able to carry on all pre-disease performance without restriction] : Status 0 - Fully active, able to carry on all pre-disease performance without restriction [Normal] : affect appropriate [de-identified] : No dominant masses no nipple retraction or discharge bilaterally; right breast circumareolar scar well healed.

## 2021-10-21 NOTE — ASSESSMENT
[FreeTextEntry1] : 68 y/o female with history of invasive ductal cancer of the right breast \par 2/29/16 - lumpectomy and sentinel LNB  T2, N0, ER/WY positive and HER 2 negative, prognostic stage I B \par S/p adjuvant RT\par On adjuvant anastrozole since May 2016 and tolerating well.\par Clinically no evidence of recurrence.\par \par Continue anastrozole for a total 7 years. ( patient states that she would like to take it longer- explained- no longer than 10 years)\par annual bilat mammo/ sono due in February.\par She follows with breast surgery.\par \par Bone density will be due in 2022.\par \par Return visit in 6 months.

## 2022-03-29 ENCOUNTER — APPOINTMENT (OUTPATIENT)
Dept: BREAST CENTER | Facility: CLINIC | Age: 67
End: 2022-03-29
Payer: COMMERCIAL

## 2022-03-29 VITALS
DIASTOLIC BLOOD PRESSURE: 85 MMHG | HEIGHT: 63 IN | SYSTOLIC BLOOD PRESSURE: 149 MMHG | BODY MASS INDEX: 43.41 KG/M2 | HEART RATE: 76 BPM | WEIGHT: 245 LBS

## 2022-03-29 PROCEDURE — 99213 OFFICE O/P EST LOW 20 MIN: CPT

## 2022-03-29 NOTE — DATA REVIEWED
[FreeTextEntry1] : Bilateral mammogram 2/21/2022:  No suspicious abnormality..\par \par \par Bilateral breast ultrasound 2/21/2022: Right 10 N4 2 mm oil cyst decreased.\par \par (Images reviewed on the [facility] portal)

## 2022-03-29 NOTE — PHYSICAL EXAM
[Sclera nonicteric] : sclera nonicteric [Supple] : supple [No Supraclavicular Adenopathy] : no supraclavicular adenopathy [No Cervical Adenopathy] : no cervical adenopathy [No Thyromegaly] : no thyromegaly [Clear to Auscultation Bilat] : clear to auscultation bilaterally [Examined in the supine and seated position] : examined in the supine and seated position [No dominant masses] : no dominant masses in right breast  [No dominant masses] : no dominant masses left breast [No Nipple Retraction] : no left nipple retraction [No Nipple Discharge] : no left nipple discharge [No Axillary Lymphadenopathy] : no left axillary lymphadenopathy [Soft] : abdomen soft [Not Tender] : non-tender [de-identified] : Scar 10-11:00 circumareolar with mild telangiectasia in skin.  Slightly pinkish skin plaque like thickening UOQ 4x2 cm and browner thickened skin UIQ 5x2 cm. [de-identified] : Axillary scar.

## 2022-03-29 NOTE — HISTORY OF PRESENT ILLNESS
[FreeTextEntry1] : This is a 66 year old  female who felt a lump in her right breast in 2016. A mammogram and ultrasound showed a suspicious lump and  biopsy confirmed cancer.\par \par She underwent a right breast lumpectomy with sentinel node biopsy in 2/2016 for a 2.2 cm ductal cancer, SLN negx2, ER+PA+Her2 neg tumor. Oncotype 15 (10%). She completed 4 weeks of radiation with boost (Dr. Case) and is on Arimidex (Dr. Obregon,then Dr. Henry, now Dr. Snyder).\par \par \par She noticed new thickening and discoloration on her right breast in February and went for a mammogram and breast ultrasound that were normal.  The area has not changed.\par

## 2022-04-01 ENCOUNTER — OUTPATIENT (OUTPATIENT)
Dept: OUTPATIENT SERVICES | Facility: HOSPITAL | Age: 67
LOS: 1 days | Discharge: HOME | End: 2022-04-01

## 2022-04-01 DIAGNOSIS — C50.911 MALIGNANT NEOPLASM OF UNSPECIFIED SITE OF RIGHT FEMALE BREAST: ICD-10-CM

## 2022-04-04 ENCOUNTER — RESULT REVIEW (OUTPATIENT)
Age: 67
End: 2022-04-04

## 2022-04-04 ENCOUNTER — APPOINTMENT (OUTPATIENT)
Dept: HEMATOLOGY ONCOLOGY | Facility: CLINIC | Age: 67
End: 2022-04-04
Payer: COMMERCIAL

## 2022-04-04 VITALS
TEMPERATURE: 97.7 F | DIASTOLIC BLOOD PRESSURE: 94 MMHG | HEART RATE: 74 BPM | BODY MASS INDEX: 44.55 KG/M2 | RESPIRATION RATE: 17 BRPM | WEIGHT: 251.5 LBS | OXYGEN SATURATION: 73 % | SYSTOLIC BLOOD PRESSURE: 147 MMHG

## 2022-04-04 LAB
BASOPHILS # BLD AUTO: 0.04 K/UL — SIGNIFICANT CHANGE UP (ref 0–0.2)
BASOPHILS NFR BLD AUTO: 0.5 % — SIGNIFICANT CHANGE UP (ref 0–2)
EOSINOPHIL # BLD AUTO: 0.39 K/UL — SIGNIFICANT CHANGE UP (ref 0–0.5)
EOSINOPHIL NFR BLD AUTO: 4.7 % — SIGNIFICANT CHANGE UP (ref 0–6)
HCT VFR BLD CALC: 34.9 % — SIGNIFICANT CHANGE UP (ref 34.5–45)
HGB BLD-MCNC: 11.3 G/DL — LOW (ref 11.5–15.5)
IMM GRANULOCYTES NFR BLD AUTO: 0.2 % — SIGNIFICANT CHANGE UP (ref 0–1.5)
LYMPHOCYTES # BLD AUTO: 2.16 K/UL — SIGNIFICANT CHANGE UP (ref 1–3.3)
LYMPHOCYTES # BLD AUTO: 25.8 % — SIGNIFICANT CHANGE UP (ref 13–44)
MCHC RBC-ENTMCNC: 29.8 PG — SIGNIFICANT CHANGE UP (ref 27–34)
MCHC RBC-ENTMCNC: 32.4 GM/DL — SIGNIFICANT CHANGE UP (ref 32–36)
MCV RBC AUTO: 92.1 FL — SIGNIFICANT CHANGE UP (ref 80–100)
MONOCYTES # BLD AUTO: 0.7 K/UL — SIGNIFICANT CHANGE UP (ref 0–0.9)
MONOCYTES NFR BLD AUTO: 8.4 % — SIGNIFICANT CHANGE UP (ref 2–14)
NEUTROPHILS # BLD AUTO: 5.05 K/UL — SIGNIFICANT CHANGE UP (ref 1.8–7.4)
NEUTROPHILS NFR BLD AUTO: 60.4 % — SIGNIFICANT CHANGE UP (ref 43–77)
NRBC # BLD: 0 /100 WBCS — SIGNIFICANT CHANGE UP (ref 0–0)
PLATELET # BLD AUTO: 279 K/UL — SIGNIFICANT CHANGE UP (ref 150–400)
RBC # BLD: 3.79 M/UL — LOW (ref 3.8–5.2)
RBC # FLD: 13.2 % — SIGNIFICANT CHANGE UP (ref 10.3–14.5)
WBC # BLD: 8.36 K/UL — SIGNIFICANT CHANGE UP (ref 3.8–10.5)
WBC # FLD AUTO: 8.36 K/UL — SIGNIFICANT CHANGE UP (ref 3.8–10.5)

## 2022-04-04 PROCEDURE — 99214 OFFICE O/P EST MOD 30 MIN: CPT

## 2022-04-04 NOTE — PHYSICAL EXAM
[Fully active, able to carry on all pre-disease performance without restriction] : Status 0 - Fully active, able to carry on all pre-disease performance without restriction [Normal] : affect appropriate [Obese] : obese [de-identified] : R breast  two areas  ( around 4 x 2 cm each) of skin plaque like  thickening and discoloration - at 11:00 and 2 :00 ;  lumpectomy scar periareolar at 11:00 with telangiectasia

## 2022-04-04 NOTE — ASSESSMENT
[FreeTextEntry1] : 67 y/o female with history of invasive ductal cancer of the right breast \par 2/29/16 - lumpectomy and sentinel LNB  T2, N0, ER/VA positive and HER 2 negative, prognostic stage I B \par S/p adjuvant RT\par On adjuvant anastrozole since May 2016 and tolerating well.\par \par \par Continue anastrozole for a total 7 years. ( patient states that she would like to take it longer- explained- no longer than 10 years)\par \par \par Bone density will be due in August 2022.\par \par Now she developed plaque like skin thickening discoloration in the right breast  unclear etiology No overt mass. mammo/ janes negative. Awaiting breast  MRI , probably will need skin biopsy\par \par

## 2022-04-04 NOTE — HISTORY OF PRESENT ILLNESS
[Disease: _____________________] : Disease: [unfilled] [T: ___] : T[unfilled] [N: ___] : N[unfilled] [M: ___] : M[unfilled] [AJCC Stage: ____] : AJCC Stage: [unfilled] [de-identified] : Presented in 2016 with palpable right breast lump. \par Mammogram/sonogram: lobulated mass with architectural distortion right breast.\par \par 2/4/16- right breast biopsy: infiltrating ductal carcinoma.\par \par 2/29/16 – lumpectomy and SLND: ( Dr Carrero)  2.2 cm moderately differentiated infiltrating ductal carcinoma, SBR 7/9, LVI+; Oncotype Dx: 15( 10%).\par \par 4/11 – 5/2/16 –completes XRT right breast ( Dr Case)\par \par 5/23/16 – started  adjuvant endocrine therapy with anastrozole.\par \par Bone density 8/2020 normal \par \par  [de-identified] : Moderately differentiated infiltrating ductal carcinoma [de-identified] : ER/VT+, Her 2 negative  [de-identified] : Noticed thickening of skin / discoloration in her right breast few weeks ago. Mammogram/ sono - negative. Saw Dr Carrero few days ago- sent for breast MRI

## 2022-04-05 LAB
24R-OH-CALCIDIOL SERPL-MCNC: 37.8 NG/ML — SIGNIFICANT CHANGE UP (ref 30–80)
ALBUMIN SERPL ELPH-MCNC: 4.3 G/DL — SIGNIFICANT CHANGE UP (ref 3.3–5)
ALP SERPL-CCNC: 82 U/L — SIGNIFICANT CHANGE UP (ref 40–120)
ALT FLD-CCNC: 13 U/L — SIGNIFICANT CHANGE UP (ref 10–45)
ANION GAP SERPL CALC-SCNC: 13 MMOL/L — SIGNIFICANT CHANGE UP (ref 5–17)
AST SERPL-CCNC: 20 U/L — SIGNIFICANT CHANGE UP (ref 10–40)
BILIRUB SERPL-MCNC: 0.2 MG/DL — SIGNIFICANT CHANGE UP (ref 0.2–1.2)
BUN SERPL-MCNC: 12 MG/DL — SIGNIFICANT CHANGE UP (ref 7–23)
CALCIUM SERPL-MCNC: 9.7 MG/DL — SIGNIFICANT CHANGE UP (ref 8.4–10.5)
CHLORIDE SERPL-SCNC: 105 MMOL/L — SIGNIFICANT CHANGE UP (ref 96–108)
CO2 SERPL-SCNC: 26 MMOL/L — SIGNIFICANT CHANGE UP (ref 22–31)
CREAT SERPL-MCNC: 0.77 MG/DL — SIGNIFICANT CHANGE UP (ref 0.5–1.3)
EGFR: 86 ML/MIN/1.73M2 — SIGNIFICANT CHANGE UP
GLUCOSE SERPL-MCNC: 100 MG/DL — HIGH (ref 70–99)
POTASSIUM SERPL-MCNC: 4.5 MMOL/L — SIGNIFICANT CHANGE UP (ref 3.5–5.3)
POTASSIUM SERPL-SCNC: 4.5 MMOL/L — SIGNIFICANT CHANGE UP (ref 3.5–5.3)
PROT SERPL-MCNC: 6.9 G/DL — SIGNIFICANT CHANGE UP (ref 6–8.3)
SODIUM SERPL-SCNC: 143 MMOL/L — SIGNIFICANT CHANGE UP (ref 135–145)

## 2022-04-06 DIAGNOSIS — Z51.81 ENCOUNTER FOR THERAPEUTIC DRUG LEVEL MONITORING: ICD-10-CM

## 2022-04-06 DIAGNOSIS — Z08 ENCOUNTER FOR FOLLOW-UP EXAMINATION AFTER COMPLETED TREATMENT FOR MALIGNANT NEOPLASM: ICD-10-CM

## 2022-04-08 DIAGNOSIS — F41.9 ANXIETY DISORDER, UNSPECIFIED: ICD-10-CM

## 2022-04-13 ENCOUNTER — APPOINTMENT (OUTPATIENT)
Dept: MRI IMAGING | Facility: CLINIC | Age: 67
End: 2022-04-13
Payer: COMMERCIAL

## 2022-04-13 ENCOUNTER — RESULT REVIEW (OUTPATIENT)
Age: 67
End: 2022-04-13

## 2022-04-13 ENCOUNTER — OUTPATIENT (OUTPATIENT)
Dept: OUTPATIENT SERVICES | Facility: HOSPITAL | Age: 67
LOS: 1 days | End: 2022-04-13
Payer: COMMERCIAL

## 2022-04-13 DIAGNOSIS — C50.911 MALIGNANT NEOPLASM OF UNSPECIFIED SITE OF RIGHT FEMALE BREAST: ICD-10-CM

## 2022-04-13 PROCEDURE — C8937: CPT

## 2022-04-13 PROCEDURE — 77049 MRI BREAST C-+ W/CAD BI: CPT | Mod: 26

## 2022-04-13 PROCEDURE — C8908: CPT

## 2022-05-13 ENCOUNTER — LABORATORY RESULT (OUTPATIENT)
Age: 67
End: 2022-05-13

## 2022-05-13 ENCOUNTER — APPOINTMENT (OUTPATIENT)
Dept: BREAST CENTER | Facility: CLINIC | Age: 67
End: 2022-05-13
Payer: COMMERCIAL

## 2022-05-13 VITALS — HEIGHT: 63 IN | WEIGHT: 251 LBS | BODY MASS INDEX: 44.47 KG/M2

## 2022-05-13 PROCEDURE — 11106 INCAL BX SKN SINGLE LES: CPT

## 2022-05-13 NOTE — DATA REVIEWED
[FreeTextEntry1] : Bilateral mammogram 2/21/2022:  No suspicious abnormality..\par \par Bilateral breast ultrasound 2/21/2022: Right 10 N4 2 mm oil cyst decreased.\par \par Bilateral breast MRI 4/13/2022:  No evidence of malignancy.\par \par

## 2022-05-13 NOTE — PHYSICAL EXAM
[de-identified] : Scar 10-11:00 circumareolar with mild telangiectasia in skin.  Slightly pinkish skin plaque like thickening UOQ 4x2 cm and browner thickened skin UIQ 5x2 cm. [de-identified] : Axillary scar.

## 2022-05-13 NOTE — HISTORY OF PRESENT ILLNESS
[FreeTextEntry1] : This is a 66 year old  female who felt a lump in her right breast in 2016. A mammogram and ultrasound showed a suspicious lump and  biopsy confirmed cancer.\par \par She underwent a right breast lumpectomy with sentinel node biopsy in 2/2016 for a 2.2 cm ductal cancer, SLN negx2, ER+[place]+Her2 neg tumor. Oncotype 15 (10%). She completed 4 weeks of radiation with boost (Dr. Case) and is on Arimidex (Dr. Obregon, Dr. Henry, Dr. Snyder, now Dr. Cam).\par \par \par She noticed new thickening and discoloration on her right breast in February and went for a mammogram and breast ultrasound that were normal.  An MRI was then done and was also normal.  She doesn't think the areas have changed.

## 2022-05-13 NOTE — PROCEDURE
[FreeTextEntry1] : Right breast skin biopsy [FreeTextEntry2] : Assess skin change [FreeTextEntry3] : The right breast was prepped and draped.  LOcal 1% lidocaine was infiltrated.  A small elliptical skin biopsy was performed in the upper inner quadrant.  The wound was closed with 2 4-0 nylon sutures.  Steristrips and a sterile dressing was placed.  The patient tolerated the procedure well.

## 2022-05-20 ENCOUNTER — APPOINTMENT (OUTPATIENT)
Dept: BREAST CENTER | Facility: CLINIC | Age: 67
End: 2022-05-20
Payer: COMMERCIAL

## 2022-05-20 VITALS
WEIGHT: 251 LBS | BODY MASS INDEX: 44.47 KG/M2 | DIASTOLIC BLOOD PRESSURE: 90 MMHG | HEIGHT: 63 IN | SYSTOLIC BLOOD PRESSURE: 122 MMHG | HEART RATE: 52 BPM

## 2022-05-20 PROCEDURE — 99211 OFF/OP EST MAY X REQ PHY/QHP: CPT

## 2022-05-20 NOTE — DATA REVIEWED
[FreeTextEntry1] : Bilateral mammogram 2/21/2022:  No suspicious abnormality..\par \par Bilateral breast ultrasound 2/21/2022: Right 10 N4 2 mm oil cyst decreased.\par \par Bilateral breast MRI 4/13/2022:  No evidence of malignancy.\par \par Pathology: 5/13/2022 right breast skin with dense stromal fibrosis c/w scar

## 2022-05-20 NOTE — HISTORY OF PRESENT ILLNESS
[FreeTextEntry1] : This is a 66 year old  female who felt a lump in her right breast in 2016. A mammogram and ultrasound showed a suspicious lump and  biopsy confirmed cancer.\par \par She underwent a right breast lumpectomy with sentinel node biopsy in 2/2016 for a 2.2 cm ductal cancer, SLN negx2, ER+PA+Her2 neg tumor. Oncotype 15 (10%). She completed 4 weeks of radiation with boost (Dr. Case) and is on Arimidex (Dr. Obregon, Dr. Henry, Dr. Snyder, now Dr. Cam).\par \par \par She noticed new thickening and discoloration on her right breast in February and went for a mammogram and breast ultrasound that were normal.  An MRI was then done and was also normal.  \par \par A skin biopsy was performed on 5/13/2022.  Pathology was benign.

## 2022-09-02 ENCOUNTER — APPOINTMENT (OUTPATIENT)
Dept: BREAST CENTER | Facility: CLINIC | Age: 67
End: 2022-09-02

## 2022-09-02 VITALS
WEIGHT: 245 LBS | BODY MASS INDEX: 43.41 KG/M2 | HEART RATE: 67 BPM | HEIGHT: 63 IN | SYSTOLIC BLOOD PRESSURE: 156 MMHG | DIASTOLIC BLOOD PRESSURE: 92 MMHG

## 2022-09-02 PROCEDURE — 99213 OFFICE O/P EST LOW 20 MIN: CPT

## 2022-09-02 NOTE — HISTORY OF PRESENT ILLNESS
[FreeTextEntry1] : This is a 67 year old  female who felt a lump in her right breast in 2016. A mammogram and ultrasound showed a suspicious lump and  biopsy confirmed cancer.\par \par She underwent a right breast lumpectomy with sentinel node biopsy in 2/2016 for a 2.2 cm ductal cancer, SLN negx2, ER+PA+Her2 neg tumor. Oncotype 15 (10%). She completed 4 weeks of radiation with boost (Dr. Case) and is on Arimidex (Dr. Obregon, Dr. Henry, Dr. Snyder, now Dr. Cam).\par \par \par She noticed new thickening and discoloration on her right breast in February and went for a mammogram and breast ultrasound that were normal.  An MRI was then done and was also normal.  \par \par A skin biopsy was performed on 5/13/2022.  Pathology was benign.\par \par She has not noticed any change.\par \par She was diagnosed with Lyme disease a year ago and does not know where the tick bite was.

## 2022-09-02 NOTE — PHYSICAL EXAM
[EOMI] : extra ocular movement intact [Sclera nonicteric] : sclera nonicteric [Supple] : supple [No Supraclavicular Adenopathy] : no supraclavicular adenopathy [No Cervical Adenopathy] : no cervical adenopathy [No Thyromegaly] : no thyromegaly [Clear to Auscultation Bilat] : clear to auscultation bilaterally [Examined in the supine and seated position] : examined in the supine and seated position [No dominant masses] : no dominant masses in right breast  [No dominant masses] : no dominant masses left breast [No Nipple Retraction] : no left nipple retraction [No Nipple Discharge] : no left nipple discharge [No Axillary Lymphadenopathy] : no left axillary lymphadenopathy [Soft] : abdomen soft [Not Tender] : non-tender [de-identified] : Scar 10-11:00 circumareolar with mild telangiectasia in skin.  Slightly pinkish skin plaque like thickening UOQ 4x2 cm and browner thickened skin UIQ 5x2 cm. [de-identified] : Axillary scar.

## 2022-10-09 ENCOUNTER — OUTPATIENT (OUTPATIENT)
Dept: OUTPATIENT SERVICES | Facility: HOSPITAL | Age: 67
LOS: 1 days | Discharge: HOME | End: 2022-10-09

## 2022-10-09 DIAGNOSIS — C50.911 MALIGNANT NEOPLASM OF UNSPECIFIED SITE OF RIGHT FEMALE BREAST: ICD-10-CM

## 2022-10-18 ENCOUNTER — RESULT REVIEW (OUTPATIENT)
Age: 67
End: 2022-10-18

## 2022-10-18 ENCOUNTER — APPOINTMENT (OUTPATIENT)
Dept: HEMATOLOGY ONCOLOGY | Facility: CLINIC | Age: 67
End: 2022-10-18

## 2022-10-18 VITALS
DIASTOLIC BLOOD PRESSURE: 84 MMHG | WEIGHT: 244.25 LBS | TEMPERATURE: 97.5 F | BODY MASS INDEX: 43.28 KG/M2 | RESPIRATION RATE: 18 BRPM | SYSTOLIC BLOOD PRESSURE: 144 MMHG | OXYGEN SATURATION: 98 % | HEART RATE: 91 BPM | HEIGHT: 62.99 IN

## 2022-10-18 DIAGNOSIS — Z51.81 ENCOUNTER FOR THERAPEUTIC DRUG LEVEL MONITORING: ICD-10-CM

## 2022-10-18 DIAGNOSIS — D64.9 ANEMIA, UNSPECIFIED: ICD-10-CM

## 2022-10-18 DIAGNOSIS — M85.80 OTHER SPECIFIED DISORDERS OF BONE DENSITY AND STRUCTURE, UNSPECIFIED SITE: ICD-10-CM

## 2022-10-18 DIAGNOSIS — E03.9 HYPOTHYROIDISM, UNSPECIFIED: ICD-10-CM

## 2022-10-18 DIAGNOSIS — Z08 ENCOUNTER FOR FOLLOW-UP EXAMINATION AFTER COMPLETED TREATMENT FOR MALIGNANT NEOPLASM: ICD-10-CM

## 2022-10-18 LAB
ALBUMIN SERPL ELPH-MCNC: 4 G/DL — SIGNIFICANT CHANGE UP (ref 3.3–5)
ALP SERPL-CCNC: 88 U/L — SIGNIFICANT CHANGE UP (ref 40–120)
ALT FLD-CCNC: 13 U/L — SIGNIFICANT CHANGE UP (ref 10–45)
ANION GAP SERPL CALC-SCNC: 9 MMOL/L — SIGNIFICANT CHANGE UP (ref 5–17)
AST SERPL-CCNC: 18 U/L — SIGNIFICANT CHANGE UP (ref 10–40)
BASOPHILS # BLD AUTO: 0.05 K/UL — SIGNIFICANT CHANGE UP (ref 0–0.2)
BASOPHILS NFR BLD AUTO: 0.8 % — SIGNIFICANT CHANGE UP (ref 0–2)
BILIRUB SERPL-MCNC: 0.3 MG/DL — SIGNIFICANT CHANGE UP (ref 0.2–1.2)
BUN SERPL-MCNC: 12 MG/DL — SIGNIFICANT CHANGE UP (ref 7–23)
CALCIUM SERPL-MCNC: 9.4 MG/DL — SIGNIFICANT CHANGE UP (ref 8.4–10.5)
CHLORIDE SERPL-SCNC: 103 MMOL/L — SIGNIFICANT CHANGE UP (ref 96–108)
CO2 SERPL-SCNC: 29 MMOL/L — SIGNIFICANT CHANGE UP (ref 22–31)
CREAT SERPL-MCNC: 0.83 MG/DL — SIGNIFICANT CHANGE UP (ref 0.5–1.3)
EGFR: 78 ML/MIN/1.73M2 — SIGNIFICANT CHANGE UP
EOSINOPHIL # BLD AUTO: 0.25 K/UL — SIGNIFICANT CHANGE UP (ref 0–0.5)
EOSINOPHIL NFR BLD AUTO: 3.9 % — SIGNIFICANT CHANGE UP (ref 0–6)
FERRITIN SERPL-MCNC: 88 NG/ML — SIGNIFICANT CHANGE UP (ref 15–150)
GLUCOSE SERPL-MCNC: 100 MG/DL — HIGH (ref 70–99)
HCT VFR BLD CALC: 33.8 % — LOW (ref 34.5–45)
HGB BLD-MCNC: 10.9 G/DL — LOW (ref 11.5–15.5)
IMM GRANULOCYTES NFR BLD AUTO: 0.5 % — SIGNIFICANT CHANGE UP (ref 0–0.9)
IRON SATN MFR SERPL: 18 % — SIGNIFICANT CHANGE UP (ref 14–50)
IRON SATN MFR SERPL: 52 UG/DL — SIGNIFICANT CHANGE UP (ref 30–160)
LYMPHOCYTES # BLD AUTO: 1.68 K/UL — SIGNIFICANT CHANGE UP (ref 1–3.3)
LYMPHOCYTES # BLD AUTO: 26 % — SIGNIFICANT CHANGE UP (ref 13–44)
MCHC RBC-ENTMCNC: 29.2 PG — SIGNIFICANT CHANGE UP (ref 27–34)
MCHC RBC-ENTMCNC: 32.2 GM/DL — SIGNIFICANT CHANGE UP (ref 32–36)
MCV RBC AUTO: 90.6 FL — SIGNIFICANT CHANGE UP (ref 80–100)
MONOCYTES # BLD AUTO: 0.66 K/UL — SIGNIFICANT CHANGE UP (ref 0–0.9)
MONOCYTES NFR BLD AUTO: 10.2 % — SIGNIFICANT CHANGE UP (ref 2–14)
NEUTROPHILS # BLD AUTO: 3.8 K/UL — SIGNIFICANT CHANGE UP (ref 1.8–7.4)
NEUTROPHILS NFR BLD AUTO: 58.6 % — SIGNIFICANT CHANGE UP (ref 43–77)
NRBC # BLD: 0 /100 WBCS — SIGNIFICANT CHANGE UP (ref 0–0)
PLATELET # BLD AUTO: 310 K/UL — SIGNIFICANT CHANGE UP (ref 150–400)
POTASSIUM SERPL-MCNC: 4.5 MMOL/L — SIGNIFICANT CHANGE UP (ref 3.5–5.3)
POTASSIUM SERPL-SCNC: 4.5 MMOL/L — SIGNIFICANT CHANGE UP (ref 3.5–5.3)
PROT SERPL-MCNC: 7 G/DL — SIGNIFICANT CHANGE UP (ref 6–8.3)
RBC # BLD: 3.73 M/UL — LOW (ref 3.8–5.2)
RBC # FLD: 13.2 % — SIGNIFICANT CHANGE UP (ref 10.3–14.5)
SODIUM SERPL-SCNC: 141 MMOL/L — SIGNIFICANT CHANGE UP (ref 135–145)
TIBC SERPL-MCNC: 290 UG/DL — SIGNIFICANT CHANGE UP (ref 220–430)
UIBC SERPL-MCNC: 238 UG/DL — SIGNIFICANT CHANGE UP (ref 110–370)
WBC # BLD: 6.47 K/UL — SIGNIFICANT CHANGE UP (ref 3.8–10.5)
WBC # FLD AUTO: 6.47 K/UL — SIGNIFICANT CHANGE UP (ref 3.8–10.5)

## 2022-10-18 PROCEDURE — 99214 OFFICE O/P EST MOD 30 MIN: CPT

## 2022-10-18 NOTE — PHYSICAL EXAM
[Fully active, able to carry on all pre-disease performance without restriction] : Status 0 - Fully active, able to carry on all pre-disease performance without restriction [Obese] : obese [Normal] : affect appropriate [de-identified] : R breast  two areas  ( around 4 x 2 cm each) of skin plaque like  thickening and discoloration - at 11:00 and 2 :00 and more scattered telangiectasia   ;  lumpectomy scar periareolar at 11:00 with telangiectasia

## 2022-10-18 NOTE — HISTORY OF PRESENT ILLNESS
[Disease: _____________________] : Disease: [unfilled] [T: ___] : T[unfilled] [N: ___] : N[unfilled] [M: ___] : M[unfilled] [AJCC Stage: ____] : AJCC Stage: [unfilled] [de-identified] : Presented in 2016 with palpable right breast lump. \par Mammogram/sonogram: lobulated mass with architectural distortion right breast.\par \par 2/4/16- right breast biopsy: infiltrating ductal carcinoma.\par \par 2/29/16 – lumpectomy and SLND: ( Dr Carrero)  2.2 cm moderately differentiated infiltrating ductal carcinoma, SBR 7/9, LVI+; Oncotype Dx: 15( 10%).\par \par 4/11 – 5/2/16 –completes XRT right breast ( Dr Case)\par \par 5/23/16 – started  adjuvant endocrine therapy with anastrozole.\par \par Bone density 8/2020 normal \par \par Early 2022 noted new discoloration skin thickening R breast. Extensive evaluation - Dr Carrero- mammo sono Breast MRI and skin biopsy- all negative Monitored clinically- exam by Dr Carrero every 3 months. [de-identified] : Moderately differentiated infiltrating ductal carcinoma [de-identified] : ER/OH+, Her 2 negative  [de-identified] : Sept 2022- bilat leg swelling Eval by PCP- arterial and venous Dopplers- superficial phlebitis LLE  Started on ASA, also increase ambulation ( she is obese and very sedentary- at her desk at work 12 hrs a day). Improved.\par Also new anemia noted Started iron supplements Cologard negative To see GI \par Also diagnosed with hypothyroidism \par Overall feels OK

## 2022-10-18 NOTE — REVIEW OF SYSTEMS
[Patient Intake Form Reviewed] : Patient intake form was reviewed [Lower Ext Edema] : lower extremity edema [Joint Stiffness] : joint stiffness [Negative] : Allergic/Immunologic

## 2022-10-18 NOTE — ASSESSMENT
[FreeTextEntry1] : 66 y/o female with history of invasive ductal cancer of the right breast \par 2/29/16 - lumpectomy and sentinel LNB  T2, N0, ER/[place] positive and HER 2 negative, prognostic stage I B \par S/p adjuvant RT\par On adjuvant anastrozole since May 2016 and tolerating well.\par \par \par Continue anastrozole for a total 7 years. ( patient states that she would like to take it longer- explained- no longer than 10 years)\par \par \par Bone density is due ( goes to [facility]).\par \par R breast skin thickening discoloration- stable Extensive evaluation negative. She follows with Dr Carrero for exam every 3 months to monitor.\par \par Anemia- new- started on iron per PCP Get iron studies GI eval pending \par \par return visit in 6 months sooner PRN \par \par

## 2022-12-23 ENCOUNTER — APPOINTMENT (OUTPATIENT)
Dept: BREAST CENTER | Facility: CLINIC | Age: 67
End: 2022-12-23

## 2022-12-23 VITALS
SYSTOLIC BLOOD PRESSURE: 138 MMHG | BODY MASS INDEX: 42.88 KG/M2 | HEIGHT: 62.99 IN | HEART RATE: 66 BPM | DIASTOLIC BLOOD PRESSURE: 74 MMHG | WEIGHT: 242 LBS

## 2022-12-23 PROCEDURE — 99213 OFFICE O/P EST LOW 20 MIN: CPT

## 2022-12-23 RX ORDER — LEVOTHYROXINE SODIUM 0.03 MG/1
25 TABLET ORAL
Refills: 0 | Status: ACTIVE | COMMUNITY

## 2022-12-23 NOTE — PHYSICAL EXAM
[EOMI] : extra ocular movement intact [Sclera nonicteric] : sclera nonicteric [Supple] : supple [No Supraclavicular Adenopathy] : no supraclavicular adenopathy [No Cervical Adenopathy] : no cervical adenopathy [No Thyromegaly] : no thyromegaly [Clear to Auscultation Bilat] : clear to auscultation bilaterally [Examined in the supine and seated position] : examined in the supine and seated position [No dominant masses] : no dominant masses in right breast  [No dominant masses] : no dominant masses left breast [No Nipple Retraction] : no left nipple retraction [No Nipple Discharge] : no left nipple discharge [No Axillary Lymphadenopathy] : no left axillary lymphadenopathy [Soft] : abdomen soft [Not Tender] : non-tender [de-identified] : Scar 10-11:00 circumareolar with mild telangiectasia in skin.  Slightly pinkish skin plaque like thickening UOQ 4x2 cm and browner thickened skin UIQ 5x2 cm without change. [de-identified] : Axillary scar.

## 2022-12-23 NOTE — HISTORY OF PRESENT ILLNESS
[FreeTextEntry1] : This is a 67 year old  female who felt a lump in her right breast in 2016. A mammogram and ultrasound showed a suspicious lump and  biopsy confirmed cancer.\par \par She underwent a right breast lumpectomy with sentinel node biopsy in 2/2016 for a 2.2 cm ductal cancer, SLN negx2, ER+[place]+Her2 neg tumor. Oncotype 15 (10%). She completed 4 weeks of radiation with boost (Dr. Case) and is on Arimidex (Dr. Obregon, Dr. Henry, Dr. Snyder, now Dr. Cam).\par \par \par She noticed new thickening and discoloration on her right breast in February 2022 and went for a mammogram and breast ultrasound that were normal.  An MRI was then done and was also normal.  \par \par A skin biopsy was performed on 5/13/2022.  Pathology was benign.\par \par She thinks the right breast is about the same.\par \par

## 2022-12-23 NOTE — PAST MEDICAL HISTORY
[Menarche Age ____] : age at menarche was [unfilled] [Menopause Age____] : age at menopause was [unfilled] [Total Preg ___] : G[unfilled] [FreeTextEntry7] : 2-3 years 09-Oct-2019 20:32

## 2023-05-16 ENCOUNTER — OUTPATIENT (OUTPATIENT)
Dept: OUTPATIENT SERVICES | Facility: HOSPITAL | Age: 68
LOS: 1 days | Discharge: HOME | End: 2023-05-16

## 2023-05-16 DIAGNOSIS — C50.911 MALIGNANT NEOPLASM OF UNSPECIFIED SITE OF RIGHT FEMALE BREAST: ICD-10-CM

## 2023-05-24 ENCOUNTER — RESULT REVIEW (OUTPATIENT)
Age: 68
End: 2023-05-24

## 2023-05-24 ENCOUNTER — APPOINTMENT (OUTPATIENT)
Dept: HEMATOLOGY ONCOLOGY | Facility: CLINIC | Age: 68
End: 2023-05-24
Payer: COMMERCIAL

## 2023-05-24 ENCOUNTER — APPOINTMENT (OUTPATIENT)
Dept: BREAST CENTER | Facility: CLINIC | Age: 68
End: 2023-05-24
Payer: COMMERCIAL

## 2023-05-24 VITALS
DIASTOLIC BLOOD PRESSURE: 81 MMHG | BODY MASS INDEX: 42.17 KG/M2 | WEIGHT: 238 LBS | SYSTOLIC BLOOD PRESSURE: 146 MMHG | HEART RATE: 66 BPM | HEIGHT: 63 IN

## 2023-05-24 DIAGNOSIS — Z87.39 PERSONAL HISTORY OF OTHER DISEASES OF THE MUSCULOSKELETAL SYSTEM AND CONNECTIVE TISSUE: ICD-10-CM

## 2023-05-24 DIAGNOSIS — Z51.81 ENCOUNTER FOR THERAPEUTIC DRUG LEVEL MONITORING: ICD-10-CM

## 2023-05-24 DIAGNOSIS — Z87.898 PERSONAL HISTORY OF OTHER SPECIFIED CONDITIONS: ICD-10-CM

## 2023-05-24 DIAGNOSIS — M85.80 OTHER SPECIFIED DISORDERS OF BONE DENSITY AND STRUCTURE, UNSPECIFIED SITE: ICD-10-CM

## 2023-05-24 LAB
24R-OH-CALCIDIOL SERPL-MCNC: 40.8 NG/ML — SIGNIFICANT CHANGE UP (ref 30–80)
ALBUMIN SERPL ELPH-MCNC: 4.4 G/DL — SIGNIFICANT CHANGE UP (ref 3.3–5)
ALP SERPL-CCNC: 78 U/L — SIGNIFICANT CHANGE UP (ref 40–120)
ALT FLD-CCNC: 14 U/L — SIGNIFICANT CHANGE UP (ref 10–45)
ANION GAP SERPL CALC-SCNC: 11 MMOL/L — SIGNIFICANT CHANGE UP (ref 5–17)
AST SERPL-CCNC: 19 U/L — SIGNIFICANT CHANGE UP (ref 10–40)
BASOPHILS # BLD AUTO: 0.04 K/UL — SIGNIFICANT CHANGE UP (ref 0–0.2)
BASOPHILS NFR BLD AUTO: 0.5 % — SIGNIFICANT CHANGE UP (ref 0–2)
BILIRUB SERPL-MCNC: 0.3 MG/DL — SIGNIFICANT CHANGE UP (ref 0.2–1.2)
BUN SERPL-MCNC: 20 MG/DL — SIGNIFICANT CHANGE UP (ref 7–23)
CALCIUM SERPL-MCNC: 9.6 MG/DL — SIGNIFICANT CHANGE UP (ref 8.4–10.5)
CHLORIDE SERPL-SCNC: 98 MMOL/L — SIGNIFICANT CHANGE UP (ref 96–108)
CO2 SERPL-SCNC: 28 MMOL/L — SIGNIFICANT CHANGE UP (ref 22–31)
CREAT SERPL-MCNC: 0.9 MG/DL — SIGNIFICANT CHANGE UP (ref 0.5–1.3)
EGFR: 71 ML/MIN/1.73M2 — SIGNIFICANT CHANGE UP
EOSINOPHIL # BLD AUTO: 0.26 K/UL — SIGNIFICANT CHANGE UP (ref 0–0.5)
EOSINOPHIL NFR BLD AUTO: 3.5 % — SIGNIFICANT CHANGE UP (ref 0–6)
FERRITIN SERPL-MCNC: 84 NG/ML — SIGNIFICANT CHANGE UP (ref 15–150)
GLUCOSE SERPL-MCNC: 109 MG/DL — HIGH (ref 70–99)
HCT VFR BLD CALC: 34.4 % — LOW (ref 34.5–45)
HGB BLD-MCNC: 11.3 G/DL — LOW (ref 11.5–15.5)
IMM GRANULOCYTES NFR BLD AUTO: 0.3 % — SIGNIFICANT CHANGE UP (ref 0–0.9)
IRON SATN MFR SERPL: 19 % — SIGNIFICANT CHANGE UP (ref 14–50)
IRON SATN MFR SERPL: 56 UG/DL — SIGNIFICANT CHANGE UP (ref 30–160)
LYMPHOCYTES # BLD AUTO: 1.75 K/UL — SIGNIFICANT CHANGE UP (ref 1–3.3)
LYMPHOCYTES # BLD AUTO: 23.9 % — SIGNIFICANT CHANGE UP (ref 13–44)
MCHC RBC-ENTMCNC: 30 PG — SIGNIFICANT CHANGE UP (ref 27–34)
MCHC RBC-ENTMCNC: 32.8 GM/DL — SIGNIFICANT CHANGE UP (ref 32–36)
MCV RBC AUTO: 91.2 FL — SIGNIFICANT CHANGE UP (ref 80–100)
MONOCYTES # BLD AUTO: 0.72 K/UL — SIGNIFICANT CHANGE UP (ref 0–0.9)
MONOCYTES NFR BLD AUTO: 9.8 % — SIGNIFICANT CHANGE UP (ref 2–14)
NEUTROPHILS # BLD AUTO: 4.54 K/UL — SIGNIFICANT CHANGE UP (ref 1.8–7.4)
NEUTROPHILS NFR BLD AUTO: 62 % — SIGNIFICANT CHANGE UP (ref 43–77)
NRBC # BLD: 0 /100 WBCS — SIGNIFICANT CHANGE UP (ref 0–0)
PLATELET # BLD AUTO: 288 K/UL — SIGNIFICANT CHANGE UP (ref 150–400)
POTASSIUM SERPL-MCNC: 4.3 MMOL/L — SIGNIFICANT CHANGE UP (ref 3.5–5.3)
POTASSIUM SERPL-SCNC: 4.3 MMOL/L — SIGNIFICANT CHANGE UP (ref 3.5–5.3)
PROT SERPL-MCNC: 6.7 G/DL — SIGNIFICANT CHANGE UP (ref 6–8.3)
RBC # BLD: 3.77 M/UL — LOW (ref 3.8–5.2)
RBC # FLD: 13 % — SIGNIFICANT CHANGE UP (ref 10.3–14.5)
SODIUM SERPL-SCNC: 138 MMOL/L — SIGNIFICANT CHANGE UP (ref 135–145)
TIBC SERPL-MCNC: 294 UG/DL — SIGNIFICANT CHANGE UP (ref 220–430)
UIBC SERPL-MCNC: 237 UG/DL — SIGNIFICANT CHANGE UP (ref 110–370)
WBC # BLD: 7.33 K/UL — SIGNIFICANT CHANGE UP (ref 3.8–10.5)
WBC # FLD AUTO: 7.33 K/UL — SIGNIFICANT CHANGE UP (ref 3.8–10.5)

## 2023-05-24 PROCEDURE — 99213 OFFICE O/P EST LOW 20 MIN: CPT

## 2023-05-24 PROCEDURE — 99214 OFFICE O/P EST MOD 30 MIN: CPT

## 2023-05-24 RX ORDER — ALPRAZOLAM 0.5 MG/1
0.5 TABLET ORAL
Qty: 2 | Refills: 0 | Status: DISCONTINUED | COMMUNITY
Start: 2022-04-08 | End: 2023-05-24

## 2023-05-24 NOTE — DATA REVIEWED
[FreeTextEntry1] : Bilateral mammogram 3/15/2023 ([facility]):  No suspicious abnormality..\par \par Bilateral breast ultrasound 3/15/2023  Right 10 N4 2 mm oil cyst without change. 10N1 scar. Left 3 N4 6x2x8 mm clustered cysts.\par \par Bilateral breast MRI 4/13/2022:  No evidence of malignancy.\par \par Pathology: 5/13/2022 right breast skin with dense stromal fibrosis c/w scar

## 2023-05-24 NOTE — HISTORY OF PRESENT ILLNESS
[Disease: _____________________] : Disease: [unfilled] [T: ___] : T[unfilled] [N: ___] : N[unfilled] [M: ___] : M[unfilled] [AJCC Stage: ____] : AJCC Stage: [unfilled] [de-identified] : Presented in 2016 with palpable right breast lump. \par Mammogram/sonogram: lobulated mass with architectural distortion right breast.\par \par 2/4/16- right breast biopsy: infiltrating ductal carcinoma.\par \par 2/29/16 – lumpectomy and SLND: ( Dr Carrero)  2.2 cm moderately differentiated infiltrating ductal carcinoma, SBR 7/9, LVI+; Oncotype Dx: 15( 10%).\par \par 4/11 – 5/2/16 –completes XRT right breast ( Dr Case)\par \par 5/23/16 – started  adjuvant endocrine therapy with anastrozole.\par \par Bone density 8/2020 normal \par \par Early 2022 noted new discoloration skin thickening R breast. Extensive evaluation - Dr Carrero- mammo sono Breast MRI and skin biopsy- all negative Monitored clinically- follows with Dr Carrero.  [de-identified] : Moderately differentiated infiltrating ductal carcinoma [de-identified] : ER/NV+, Her 2 negative  [de-identified] : Mild anemia noted last year . Started iron supplements  iron once daily per PCP. Cologard negative . Monitored by PCP. \par Overall feels well.

## 2023-05-24 NOTE — ASSESSMENT
[FreeTextEntry1] : 66 y/o female with history of invasive ductal cancer of the right breast \par 2/29/16 - lumpectomy and sentinel LN biopsy   T2, N0, ER/TX positive and HER 2 negative, prognostic stage I B \par S/p adjuvant RT\par On adjuvant anastrozole since May 2016 and tolerating well.\par \par She   completed 7 years of adjuvant AI but wants to take it longer- " as long as possible"- explained that should take no longer than 10 years as long as tolerating well.\par She is due for bone density ( Bone density in 2020 was normal) Rx given, goes to [facility].\par \par R breast skin thickening discoloration- stable Extensive evaluation negative. She follows with Dr Carrero for exam ( has appointment today ) every 3 months to monitor.\par \par Hx of mild anemia  on iron per PCP . Improving. Iron studies pending . She was referred  to GI by her PCP.\par \par return visit in 6 months sooner PRN \par \par

## 2023-05-24 NOTE — PHYSICAL EXAM
[Fully active, able to carry on all pre-disease performance without restriction] : Status 0 - Fully active, able to carry on all pre-disease performance without restriction [Obese] : obese [Normal] : affect appropriate [de-identified] : R breast  slight skin plaque like  thickening and discoloration - at 11:00 and 2 :00 and more scattered telangiectasia   ;  lumpectomy scar periareolar at 11:00 with telangiectasia

## 2023-05-24 NOTE — PHYSICAL EXAM
[EOMI] : extra ocular movement intact [Sclera nonicteric] : sclera nonicteric [Supple] : supple [No Supraclavicular Adenopathy] : no supraclavicular adenopathy [No Cervical Adenopathy] : no cervical adenopathy [No Thyromegaly] : no thyromegaly [Clear to Auscultation Bilat] : clear to auscultation bilaterally [Examined in the supine and seated position] : examined in the supine and seated position [No dominant masses] : no dominant masses in right breast  [No dominant masses] : no dominant masses left breast [No Nipple Retraction] : no left nipple retraction [No Nipple Discharge] : no left nipple discharge [No Axillary Lymphadenopathy] : no left axillary lymphadenopathy [Soft] : abdomen soft [Not Tender] : non-tender [de-identified] : Scar 10-11:00 circumareolar with mild telangiectasia in skin.  Arc of slightly pinkish skin plaque like thickening UOQ 4x2 cm and browner thickened skin UIQ 5x2 cm without change. [de-identified] : Axillary scar.

## 2023-05-24 NOTE — REVIEW OF SYSTEMS
[Patient Intake Form Reviewed] : Patient intake form was reviewed [Lower Ext Edema] : lower extremity edema [Joint Stiffness] : joint stiffness [Negative] : Allergic/Immunologic [FreeTextEntry5] : mild chronic ankle edema- evaluated by vascular  [de-identified] : mild numbness tingling

## 2023-05-24 NOTE — HISTORY OF PRESENT ILLNESS
[FreeTextEntry1] : This is a 67 year old  female who felt a lump in her right breast in 2016. A mammogram and ultrasound showed a suspicious lump and  biopsy confirmed cancer.\par \par She underwent a right breast lumpectomy with sentinel node biopsy in 2/2016 for a 2.2 cm ductal cancer, SLN negx2, ER+[place]+Her2 neg tumor. Oncotype 15 (10%). She completed 4 weeks of radiation with boost (Dr. Case) and is on Arimidex (Dr. Obregon, Dr. Henry, Dr. Snyder, now Dr. Cam).\par \par \par She noticed new thickening and discoloration on her right breast in February 2022 and went for a mammogram and breast ultrasound that were normal.  An MRI was then done and was also normal.  \par \par A skin biopsy was performed on 5/13/2022.  Pathology was benign.\par \par She thinks the right breast is about the same.\par \par

## 2023-06-03 NOTE — REASON FOR VISIT
111 [Follow-Up Visit] : a follow-up [FreeTextEntry2] : right breast infiltrating ductal carcinoma on adjuvant anastrozole

## 2023-07-03 ENCOUNTER — APPOINTMENT (OUTPATIENT)
Dept: RADIOLOGY | Facility: CLINIC | Age: 68
End: 2023-07-03
Payer: COMMERCIAL

## 2023-07-03 ENCOUNTER — OUTPATIENT (OUTPATIENT)
Dept: OUTPATIENT SERVICES | Facility: HOSPITAL | Age: 68
LOS: 1 days | End: 2023-07-03
Payer: COMMERCIAL

## 2023-07-03 ENCOUNTER — APPOINTMENT (OUTPATIENT)
Dept: MRI IMAGING | Facility: CLINIC | Age: 68
End: 2023-07-03
Payer: COMMERCIAL

## 2023-07-03 DIAGNOSIS — R23.4 CHANGES IN SKIN TEXTURE: ICD-10-CM

## 2023-07-03 DIAGNOSIS — Z00.8 ENCOUNTER FOR OTHER GENERAL EXAMINATION: ICD-10-CM

## 2023-07-03 DIAGNOSIS — C50.911 MALIGNANT NEOPLASM OF UNSPECIFIED SITE OF RIGHT FEMALE BREAST: ICD-10-CM

## 2023-07-03 PROCEDURE — 77049 MRI BREAST C-+ W/CAD BI: CPT | Mod: 26

## 2023-07-03 PROCEDURE — C8937: CPT

## 2023-07-03 PROCEDURE — 77085 DXA BONE DENSITY AXL VRT FX: CPT | Mod: 26

## 2023-07-03 PROCEDURE — 77085 DXA BONE DENSITY AXL VRT FX: CPT

## 2023-07-03 PROCEDURE — C8908: CPT

## 2023-07-26 ENCOUNTER — LABORATORY RESULT (OUTPATIENT)
Age: 68
End: 2023-07-26

## 2023-07-26 ENCOUNTER — APPOINTMENT (OUTPATIENT)
Dept: BREAST CENTER | Facility: CLINIC | Age: 68
End: 2023-07-26
Payer: COMMERCIAL

## 2023-07-26 VITALS
HEIGHT: 63 IN | SYSTOLIC BLOOD PRESSURE: 134 MMHG | HEART RATE: 68 BPM | DIASTOLIC BLOOD PRESSURE: 80 MMHG | WEIGHT: 235 LBS | BODY MASS INDEX: 41.64 KG/M2

## 2023-07-26 DIAGNOSIS — R92.8 OTHER ABNORMAL AND INCONCLUSIVE FINDINGS ON DIAGNOSTIC IMAGING OF BREAST: ICD-10-CM

## 2023-07-26 PROCEDURE — 11106 INCAL BX SKN SINGLE LES: CPT

## 2023-07-26 NOTE — PROCEDURE
[FreeTextEntry1] : Right breast skin biopsy [FreeTextEntry2] : Assess MRI finding [FreeTextEntry3] : The area was measured and marked.  The right 11-12:00 breast was prepped and draped.  LOcal 1% lidocaine was infiltrated.  A small ellipse of skin was excised and will be sent to pathology.  The wound was closed with 2 #4-0 nylon sutures.  Steri-Strips and a sterile dressing was applied.  The patient tolerated the procedure well.

## 2023-07-26 NOTE — HISTORY OF PRESENT ILLNESS
[FreeTextEntry1] : This is a 67 year old  female who felt a lump in her right breast in 2016. A mammogram and ultrasound showed a suspicious lump and  biopsy confirmed cancer.\par \par She underwent a right breast lumpectomy with sentinel node biopsy in 2/2016 for a 2.2 cm ductal cancer, SLN negx2, ER+[place]+Her2 neg tumor. Oncotype 15 (10%). She completed 4 weeks of radiation with boost (Dr. Case) and is on Arimidex (Dr. Obregon, Dr. Henry, Dr. Snyder, now Dr. Cam).\par \par \par She noticed new thickening and discoloration on her right breast in February 2022 and went for a mammogram and breast ultrasound that were normal.  An MRI was then done and was also normal.  \par \par A skin biopsy was performed on 5/13/2022 of the skin in the upper inner breast.  Pathology was benign.\par \par She had a follow-up breast MRI and there was enhancement in the skin at 11-12:00 for which biopsy was advised.\par \par

## 2023-07-26 NOTE — PHYSICAL EXAM
[No dominant masses] : no dominant masses in right breast  [No Nipple Retraction] : no right nipple retraction [No Nipple Discharge] : no right nipple discharge [de-identified] : Scar 10-11:00 circumareolar with mild telangiectasia in skin.  Arc of slightly pinkish skin plaque like thickening UOQ 4x2 cm and browner thickened skin UIQ 5x2 cm without change.

## 2023-07-26 NOTE — DATA REVIEWED
[FreeTextEntry1] : Bilateral mammogram 3/15/2023 ([facility]):  No suspicious abnormality..\par \par Bilateral breast ultrasound 3/15/2023  Right 10 N4 2 mm oil cyst without change. 10N1 scar. Left 3 N4 6x2x8 mm clustered cysts.\par \par Pathology: 5/13/2022 right breast skin with dense stromal fibrosis c/w scar\par \par Bilateral breast MRI 7/3/2023:  Right 11-12 N7 25x30 mm area of skin thickening and enhancement.  COnsider dermal biopsy.

## 2023-08-02 ENCOUNTER — APPOINTMENT (OUTPATIENT)
Dept: BREAST CENTER | Facility: CLINIC | Age: 68
End: 2023-08-02
Payer: COMMERCIAL

## 2023-08-02 VITALS
WEIGHT: 235 LBS | HEART RATE: 71 BPM | HEIGHT: 63 IN | SYSTOLIC BLOOD PRESSURE: 144 MMHG | DIASTOLIC BLOOD PRESSURE: 88 MMHG | BODY MASS INDEX: 41.64 KG/M2

## 2023-08-02 PROCEDURE — 99211 OFF/OP EST MAY X REQ PHY/QHP: CPT

## 2023-08-02 NOTE — DATA REVIEWED
[FreeTextEntry1] : [facility]  No suspicious abnormality..  Bilateral breast ultrasound 3/15/2023  Right 10 N4 2 mm oil cyst without change. 10N1 scar. Left 3 N4 6x2x8 mm clustered cysts.  Pathology: 5/13/2022 right breast skin with dense stromal fibrosis c/w scar  Bilateral breast MRI 7/3/2023:  Right 11-12 N7 25x30 mm area of skin thickening and enhancement.  Consider dermal biopsy.  Pathology 7/26/2023:  Right breast 11= fat necrosis and fibrosis

## 2023-08-02 NOTE — HISTORY OF PRESENT ILLNESS
[FreeTextEntry1] : This is a 67 year old  female who felt a lump in her right breast in 2016. A mammogram and ultrasound showed a suspicious lump and  biopsy confirmed cancer.  She underwent a right breast lumpectomy with sentinel node biopsy in 2/2016 for a 2.2 cm ductal cancer, SLN negx2, ER+[place]+Her2 neg tumor. Oncotype 15 (10%). She completed 4 weeks of radiation with boost (Dr. Case) and is on Arimidex (Dr. Obregon, Dr. Henry, Dr. Snyder, now Dr. Cam).   She noticed new thickening and discoloration on her right breast in February 2022 and went for a mammogram and breast ultrasound that were normal.  An MRI was then done and was also normal.    A skin biopsy was performed on 5/13/2022 of the skin in the upper inner breast.  Pathology was benign.  She had a follow-up breast MRI and there was enhancement in the skin at 11-12:00 for which biopsy was advised.  Another skin biopsy was performed on July 26.  Pathology is benign. She presents for suture removal.

## 2023-11-21 ENCOUNTER — OUTPATIENT (OUTPATIENT)
Dept: OUTPATIENT SERVICES | Facility: HOSPITAL | Age: 68
LOS: 1 days | Discharge: HOME | End: 2023-11-21

## 2023-11-21 DIAGNOSIS — C50.911 MALIGNANT NEOPLASM OF UNSPECIFIED SITE OF RIGHT FEMALE BREAST: ICD-10-CM

## 2023-11-22 ENCOUNTER — APPOINTMENT (OUTPATIENT)
Dept: HEMATOLOGY ONCOLOGY | Facility: CLINIC | Age: 68
End: 2023-11-22
Payer: COMMERCIAL

## 2023-11-22 VITALS
WEIGHT: 233 LBS | BODY MASS INDEX: 41.29 KG/M2 | DIASTOLIC BLOOD PRESSURE: 81 MMHG | HEIGHT: 63 IN | SYSTOLIC BLOOD PRESSURE: 138 MMHG | RESPIRATION RATE: 18 BRPM | OXYGEN SATURATION: 96 % | HEART RATE: 64 BPM

## 2023-11-22 DIAGNOSIS — Z08 ENCOUNTER FOR FOLLOW-UP EXAMINATION AFTER COMPLETED TREATMENT FOR MALIGNANT NEOPLASM: ICD-10-CM

## 2023-11-22 DIAGNOSIS — Z51.81 ENCOUNTER FOR THERAPEUTIC DRUG LEVEL MONITORING: ICD-10-CM

## 2023-11-22 PROCEDURE — 99214 OFFICE O/P EST MOD 30 MIN: CPT

## 2023-12-26 ENCOUNTER — NON-APPOINTMENT (OUTPATIENT)
Age: 68
End: 2023-12-26

## 2024-04-01 ENCOUNTER — APPOINTMENT (OUTPATIENT)
Dept: MAMMOGRAPHY | Facility: CLINIC | Age: 69
End: 2024-04-01
Payer: COMMERCIAL

## 2024-04-01 ENCOUNTER — RESULT REVIEW (OUTPATIENT)
Age: 69
End: 2024-04-01

## 2024-04-01 ENCOUNTER — OUTPATIENT (OUTPATIENT)
Dept: OUTPATIENT SERVICES | Facility: HOSPITAL | Age: 69
LOS: 1 days | End: 2024-04-01
Payer: COMMERCIAL

## 2024-04-01 ENCOUNTER — APPOINTMENT (OUTPATIENT)
Dept: ULTRASOUND IMAGING | Facility: CLINIC | Age: 69
End: 2024-04-01
Payer: COMMERCIAL

## 2024-04-01 DIAGNOSIS — Z48.02 ENCOUNTER FOR REMOVAL OF SUTURES: ICD-10-CM

## 2024-04-01 DIAGNOSIS — C50.911 MALIGNANT NEOPLASM OF UNSPECIFIED SITE OF RIGHT FEMALE BREAST: ICD-10-CM

## 2024-04-01 DIAGNOSIS — R23.4 CHANGES IN SKIN TEXTURE: ICD-10-CM

## 2024-04-01 PROCEDURE — 77066 DX MAMMO INCL CAD BI: CPT | Mod: 26

## 2024-04-01 PROCEDURE — G0279: CPT

## 2024-04-01 PROCEDURE — 76641 ULTRASOUND BREAST COMPLETE: CPT

## 2024-04-01 PROCEDURE — 76641 ULTRASOUND BREAST COMPLETE: CPT | Mod: 26,50

## 2024-04-01 PROCEDURE — 77066 DX MAMMO INCL CAD BI: CPT

## 2024-04-01 PROCEDURE — G0279: CPT | Mod: 26

## 2024-04-09 ENCOUNTER — APPOINTMENT (OUTPATIENT)
Dept: BREAST CENTER | Facility: CLINIC | Age: 69
End: 2024-04-09
Payer: COMMERCIAL

## 2024-04-09 VITALS
WEIGHT: 233 LBS | HEART RATE: 67 BPM | SYSTOLIC BLOOD PRESSURE: 161 MMHG | BODY MASS INDEX: 41.29 KG/M2 | DIASTOLIC BLOOD PRESSURE: 89 MMHG | HEIGHT: 63 IN

## 2024-04-09 DIAGNOSIS — R23.4 CHANGES IN SKIN TEXTURE: ICD-10-CM

## 2024-04-09 PROCEDURE — 99213 OFFICE O/P EST LOW 20 MIN: CPT

## 2024-04-09 NOTE — DATA REVIEWED
[FreeTextEntry1] : Bilateral mammogram and breast ultrasound 4/01/2024:  No evidence of malignancy.  (Images reviewed on PACS.)  Bilateral breast MRI 7/3/2023:  Right 11-12 N7 25x30 mm area of skin thickening and enhancement.  Consider dermal biopsy.  Pathology 7/26/2023:  Right breast 11= fat necrosis and fibrosis

## 2024-04-09 NOTE — PHYSICAL EXAM
[EOMI] : extra ocular movement intact [Sclera nonicteric] : sclera nonicteric [Supple] : supple [No Supraclavicular Adenopathy] : no supraclavicular adenopathy [No Cervical Adenopathy] : no cervical adenopathy [No Thyromegaly] : no thyromegaly [Clear to Auscultation Bilat] : clear to auscultation bilaterally [Examined in the supine and seated position] : examined in the supine and seated position [No dominant masses] : no dominant masses in right breast  [No dominant masses] : no dominant masses left breast [No Nipple Retraction] : no left nipple retraction [No Nipple Discharge] : no left nipple discharge [No Axillary Lymphadenopathy] : no left axillary lymphadenopathy [Soft] : abdomen soft [Not Tender] : non-tender [de-identified] : Plaque-like skin thickening upper outer extending to upper inner breast.  Telangiectasias periareola. Scar 10-11:00.

## 2024-04-09 NOTE — HISTORY OF PRESENT ILLNESS
[FreeTextEntry1] : This is a 68 year old  female who felt a lump in her right breast in 2016. A mammogram and ultrasound showed a suspicious lump and  biopsy confirmed cancer.  She underwent a right breast lumpectomy with sentinel node biopsy in 2/2016 for a 2.2 cm ductal cancer, SLN negx2, ER+SC+Her2 neg tumor. Oncotype 15 (10%). She completed 4 weeks of radiation with boost (Dr. Case) and is on Arimidex (Dr. Obregon, Dr. Henry, Dr. Snyder, now Dr. Cam).  She noticed new thickening and discoloration on her right breast in February 2022 and went for a mammogram and breast ultrasound that were normal.  An MRI was then done and was also normal.    A skin biopsy was performed on 5/13/2022 of the skin in the upper inner breast.  Pathology was benign.  She had a follow-up breast MRI and there was enhancement in the skin at 11-12:00 for which biopsy was advised.  Another skin biopsy was performed on July 26.  Pathology was benign.   She has not noticed any further change.  She has a follow-up with Dr. Case scheduled.

## 2024-04-14 ENCOUNTER — NON-APPOINTMENT (OUTPATIENT)
Age: 69
End: 2024-04-14

## 2024-04-15 ENCOUNTER — APPOINTMENT (OUTPATIENT)
Dept: RADIATION ONCOLOGY | Facility: CLINIC | Age: 69
End: 2024-04-15
Payer: COMMERCIAL

## 2024-04-15 VITALS
BODY MASS INDEX: 41.29 KG/M2 | WEIGHT: 233 LBS | OXYGEN SATURATION: 96 % | DIASTOLIC BLOOD PRESSURE: 72 MMHG | SYSTOLIC BLOOD PRESSURE: 110 MMHG | RESPIRATION RATE: 18 BRPM | HEIGHT: 63 IN | HEART RATE: 78 BPM

## 2024-04-15 PROCEDURE — 99203 OFFICE O/P NEW LOW 30 MIN: CPT

## 2024-04-15 RX ORDER — NEBIVOLOL HYDROCHLORIDE 5 MG/1
5 TABLET ORAL
Refills: 0 | Status: DISCONTINUED | COMMUNITY
End: 2024-04-15

## 2024-04-15 RX ORDER — PRAVASTATIN SODIUM 80 MG/1
TABLET ORAL
Refills: 0 | Status: ACTIVE | COMMUNITY

## 2024-04-15 RX ORDER — NEBIVOLOL 20 MG/1
TABLET ORAL
Refills: 0 | Status: ACTIVE | COMMUNITY

## 2024-04-15 NOTE — PHYSICAL EXAM
[Normal] : well developed, well nourished, in no acute distress [General Appearance - Well Developed] : well developed [No UE Edema] : there is no upper extremity edema [de-identified] : R breast hyperpigmented thickened skin in C shape laterally with underlying telangiectasis extending to areola border.

## 2024-04-15 NOTE — HISTORY OF PRESENT ILLNESS
[FreeTextEntry1] : The patient is a pleasant 68 year old female treated for right breast cancer in 2016. Presented in 2016 with palpable right breast lump. Lumpectomy ( Dr. Carrero) resulted as infiltrating ductal carcinoma, SBR 7/9, LVI+; Oncotype Dx: 15( 10%).    XRT right breast ( Dr Case) Received 42.4 gray followed by a 10 gray tumor bed boost. Completed on 5/9/16 5/23/16 - started adjuvant endocrine therapy with anastrozole ( Dr. Obregon, Lillian Henry and currently dr. Cam) Patient opted to continue Anastrozole for 10 years.  Early 2022 noted new discoloration skin thickening R breast. Extensive evaluation - Dr Carrero- mammo sono Breast MRI and skin biopsy ( x 2 pathology revealed fat necrosis and fibrosis - most recent in July 2023) - all negative Monitored clinically- follows with Dr Carrero.  7/03/23 MRI showed Postlumpectomy changes in the right breast again noted. There is new mild focal skin thickening and enhancement seen in the upper central right breast which may correspond to the clinical symptoms noted by the patient. Suggest further evaluation clinically including consideration for dermal biopsy.   7/26/23 Right breast biopsy negative for malignancy. The right breast is not painful and she overall feels well. She retired from work in Dec 2023 and plans to begin exercising. She presents to review these right breast skin changes.

## 2024-06-10 ENCOUNTER — RX RENEWAL (OUTPATIENT)
Age: 69
End: 2024-06-10

## 2024-06-11 ENCOUNTER — OUTPATIENT (OUTPATIENT)
Dept: OUTPATIENT SERVICES | Facility: HOSPITAL | Age: 69
LOS: 1 days | Discharge: HOME | End: 2024-06-11

## 2024-06-11 DIAGNOSIS — C50.911 MALIGNANT NEOPLASM OF UNSPECIFIED SITE OF RIGHT FEMALE BREAST: ICD-10-CM

## 2024-06-11 PROBLEM — Z48.02 VISIT FOR SUTURE REMOVAL: Status: RESOLVED | Noted: 2023-08-02 | Resolved: 2024-06-11

## 2024-06-12 ENCOUNTER — APPOINTMENT (OUTPATIENT)
Dept: HEMATOLOGY ONCOLOGY | Facility: CLINIC | Age: 69
End: 2024-06-12
Payer: COMMERCIAL

## 2024-06-12 VITALS
HEIGHT: 63 IN | BODY MASS INDEX: 41.29 KG/M2 | OXYGEN SATURATION: 95 % | TEMPERATURE: 98 F | DIASTOLIC BLOOD PRESSURE: 93 MMHG | WEIGHT: 233 LBS | SYSTOLIC BLOOD PRESSURE: 150 MMHG | HEART RATE: 70 BPM

## 2024-06-12 DIAGNOSIS — Z51.81 ENCOUNTER FOR THERAPEUTIC DRUG LVL MONITORING: ICD-10-CM

## 2024-06-12 DIAGNOSIS — E66.9 OBESITY, UNSPECIFIED: ICD-10-CM

## 2024-06-12 DIAGNOSIS — C50.911 MALIGNANT NEOPLASM OF UNSPECIFIED SITE OF RIGHT FEMALE BREAST: ICD-10-CM

## 2024-06-12 DIAGNOSIS — Z08 ENCOUNTER FOR FOLLOW-UP EXAMINATION AFTER COMPLETED TREATMENT FOR MALIGNANT NEOPLASM: ICD-10-CM

## 2024-06-12 DIAGNOSIS — Z85.3 ENCOUNTER FOR FOLLOW-UP EXAMINATION AFTER COMPLETED TREATMENT FOR MALIGNANT NEOPLASM: ICD-10-CM

## 2024-06-12 DIAGNOSIS — Z79.899 ENCOUNTER FOR THERAPEUTIC DRUG LVL MONITORING: ICD-10-CM

## 2024-06-12 DIAGNOSIS — Z48.02 ENCOUNTER FOR REMOVAL OF SUTURES: ICD-10-CM

## 2024-06-12 PROCEDURE — 99213 OFFICE O/P EST LOW 20 MIN: CPT

## 2024-06-12 PROCEDURE — G2211 COMPLEX E/M VISIT ADD ON: CPT | Mod: NC,1L

## 2024-06-12 NOTE — REVIEW OF SYSTEMS
[Patient Intake Form Reviewed] : Patient intake form was reviewed [Lower Ext Edema] : lower extremity edema [Joint Stiffness] : joint stiffness [Negative] : Allergic/Immunologic [FreeTextEntry5] : mild chronic ankle edema - not new [FreeTextEntry8] : frequency [de-identified] : mild numbness tingling

## 2024-06-12 NOTE — PHYSICAL EXAM
[Obese] : obese [Normal] : affect appropriate [de-identified] : anicteric [de-identified] : Deferred as was examined by breast surgeon 4/9/24.  [de-identified] : no rashes

## 2024-06-12 NOTE — HISTORY OF PRESENT ILLNESS
[Disease: _____________________] : Disease: [unfilled] [T: ___] : T[unfilled] [N: ___] : N[unfilled] [M: ___] : M[unfilled] [AJCC Stage: ____] : AJCC Stage: [unfilled] [de-identified] : JULIETTE GALLOWAY is a 68 y.o. F with a PMH significant for HTN, Hypothyroidism, obesity, Lyme disease, and a mild chronic anemia, who we are following for an ER+. HER2-, Oncotype low risk. right sided T2N0 prognostic stage IB ductal BC.   Presented in 2016 with palpable right breast lump.  Mammogram/sonogram - lobulated mass with architectural distortion right breast. 2/4/16 - Right breast biopsy - infiltrating ductal carcinoma. ER 93%, PA 95%, HER2 0/negative.  2/29/16 - Right lumpectomy and SLND - (Dr. Ashli Johnson) - 2.2 cm moderately differentiated infiltrating ductal carcinoma (7/9), LVI+; Oncotype 15 (10%).  4/11/16 - 5/2/16 - XRT right breast (Dr. Case)  5/23/16 - started adjuvant endocrine therapy with anastrozole.  Early 2022 noted new discoloration skin thickening R breast.  Extensive evaluation - Dr. Chrissy Johnson mammo sono Breast MRI and skin biopsy ( x 2 - most recent in July 2023) - all negative.  Monitored clinically- follows with Dr Chrissy Johnson. [de-identified] : Moderately differentiated infiltrating ductal carcinoma [de-identified] : ER 93%, CT 95%, HER2 0/negative  [de-identified] : Feels well. Right breast sensitive - no change. No new issues. Patient reports baseline LE edema - no change. No issues with Anastrozole.   Interested in started on a weight loss medication. Was asked by PCP to check if Wegovy or Zepbound OK from our perspective.  Had routine colonoscopy in January.  Denies any changes in her family, medical, or social history since her last visit of 11/22/23.

## 2024-06-12 NOTE — ASSESSMENT
[FreeTextEntry1] : Patient is a 68 y.o. with an ER+. HER2-, Oncotype low risk. right sided T2N0 prognostic stage IB ductal BC diagnosed in 2016, s/p lumpectomy, XRT.   On adjuvant anastrozole since 5/2016 and tolerating well. She opted for extended hormonal therapy for 10 years (she wants to take AI as long as possible - tolerating well; bone density normal). Continue anastrozole until 5/2026. Continue with surveillance: She follows with Dr Carrero for periodic exam - R breast skin thickening discoloration stable Extensive evaluation negative.   Last mammo/Sono 4/1/24. Saw Dr. Hicks 4/9/24.  Scheduled for MRI 7/2024.  GYN - doesn't go anymore. Bone density - 7/3/23 - Normal Colonoscopy - 1/2024 Dr. Fischer ([facility]) - states was good but prep was poor so needs to repeat in 1 year.  RTO 1 year, sooner PRN  Dr. Suyapa Holland (PCP) Dr. Ashli Johnson (Breast Surgery) Dr. Prisca Case (Rad/Onc) Dr. Fischer ([facility])

## 2024-06-12 NOTE — REASON FOR VISIT
[Follow-Up Visit] : a follow-up [FreeTextEntry2] : right breast cancer - adjuvant anastrozole ~ 8 years

## 2024-09-06 ENCOUNTER — OUTPATIENT (OUTPATIENT)
Dept: OUTPATIENT SERVICES | Facility: HOSPITAL | Age: 69
LOS: 1 days | End: 2024-09-06
Payer: COMMERCIAL

## 2024-09-06 ENCOUNTER — APPOINTMENT (OUTPATIENT)
Dept: MRI IMAGING | Facility: CLINIC | Age: 69
End: 2024-09-06

## 2024-09-06 DIAGNOSIS — C50.911 MALIGNANT NEOPLASM OF UNSPECIFIED SITE OF RIGHT FEMALE BREAST: ICD-10-CM

## 2024-09-06 PROCEDURE — A9585: CPT

## 2024-09-06 PROCEDURE — C8908: CPT

## 2024-09-06 PROCEDURE — C8937: CPT

## 2024-09-06 PROCEDURE — 77049 MRI BREAST C-+ W/CAD BI: CPT | Mod: 26

## 2024-09-10 ENCOUNTER — RESULT REVIEW (OUTPATIENT)
Age: 69
End: 2024-09-10

## 2024-09-16 ENCOUNTER — OUTPATIENT (OUTPATIENT)
Dept: OUTPATIENT SERVICES | Facility: HOSPITAL | Age: 69
LOS: 1 days | End: 2024-09-16
Payer: COMMERCIAL

## 2024-09-16 ENCOUNTER — RESULT REVIEW (OUTPATIENT)
Age: 69
End: 2024-09-16

## 2024-09-16 ENCOUNTER — APPOINTMENT (OUTPATIENT)
Dept: ULTRASOUND IMAGING | Facility: CLINIC | Age: 69
End: 2024-09-16
Payer: COMMERCIAL

## 2024-09-16 DIAGNOSIS — R92.8 OTHER ABNORMAL AND INCONCLUSIVE FINDINGS ON DIAGNOSTIC IMAGING OF BREAST: ICD-10-CM

## 2024-09-16 PROCEDURE — 76642 ULTRASOUND BREAST LIMITED: CPT | Mod: 26,LT

## 2024-09-16 PROCEDURE — 76642 ULTRASOUND BREAST LIMITED: CPT

## 2024-10-25 ENCOUNTER — APPOINTMENT (OUTPATIENT)
Dept: BREAST CENTER | Facility: CLINIC | Age: 69
End: 2024-10-25
Payer: COMMERCIAL

## 2024-10-25 ENCOUNTER — NON-APPOINTMENT (OUTPATIENT)
Age: 69
End: 2024-10-25

## 2024-10-25 VITALS
HEIGHT: 63 IN | WEIGHT: 232 LBS | BODY MASS INDEX: 41.11 KG/M2 | SYSTOLIC BLOOD PRESSURE: 140 MMHG | HEART RATE: 91 BPM | DIASTOLIC BLOOD PRESSURE: 87 MMHG

## 2024-10-25 DIAGNOSIS — R92.8 OTHER ABNORMAL AND INCONCLUSIVE FINDINGS ON DIAGNOSTIC IMAGING OF BREAST: ICD-10-CM

## 2024-10-25 DIAGNOSIS — C50.911 MALIGNANT NEOPLASM OF UNSPECIFIED SITE OF RIGHT FEMALE BREAST: ICD-10-CM

## 2024-10-25 PROCEDURE — 99213 OFFICE O/P EST LOW 20 MIN: CPT

## 2025-01-28 ENCOUNTER — OUTPATIENT (OUTPATIENT)
Dept: OUTPATIENT SERVICES | Facility: HOSPITAL | Age: 70
LOS: 1 days | Discharge: HOME | End: 2025-01-28

## 2025-01-28 DIAGNOSIS — C50.911 MALIGNANT NEOPLASM OF UNSPECIFIED SITE OF RIGHT FEMALE BREAST: ICD-10-CM

## 2025-02-04 ENCOUNTER — RESULT REVIEW (OUTPATIENT)
Age: 70
End: 2025-02-04

## 2025-02-04 ENCOUNTER — APPOINTMENT (OUTPATIENT)
Dept: HEMATOLOGY ONCOLOGY | Facility: CLINIC | Age: 70
End: 2025-02-04
Payer: COMMERCIAL

## 2025-02-04 VITALS
SYSTOLIC BLOOD PRESSURE: 154 MMHG | BODY MASS INDEX: 42.45 KG/M2 | OXYGEN SATURATION: 95 % | HEIGHT: 63 IN | WEIGHT: 239.56 LBS | DIASTOLIC BLOOD PRESSURE: 97 MMHG | TEMPERATURE: 97.6 F | HEART RATE: 88 BPM

## 2025-02-04 DIAGNOSIS — Z51.81 ENCOUNTER FOR THERAPEUTIC DRUG LVL MONITORING: ICD-10-CM

## 2025-02-04 DIAGNOSIS — Z08 ENCOUNTER FOR FOLLOW-UP EXAMINATION AFTER COMPLETED TREATMENT FOR MALIGNANT NEOPLASM: ICD-10-CM

## 2025-02-04 DIAGNOSIS — Z51.81 ENCOUNTER FOR THERAPEUTIC DRUG LEVEL MONITORING: ICD-10-CM

## 2025-02-04 DIAGNOSIS — D64.9 ANEMIA, UNSPECIFIED: ICD-10-CM

## 2025-02-04 DIAGNOSIS — Z85.3 ENCOUNTER FOR FOLLOW-UP EXAMINATION AFTER COMPLETED TREATMENT FOR MALIGNANT NEOPLASM: ICD-10-CM

## 2025-02-04 DIAGNOSIS — Z79.899 ENCOUNTER FOR THERAPEUTIC DRUG LVL MONITORING: ICD-10-CM

## 2025-02-04 DIAGNOSIS — C50.911 MALIGNANT NEOPLASM OF UNSPECIFIED SITE OF RIGHT FEMALE BREAST: ICD-10-CM

## 2025-02-04 LAB
BASOPHILS # BLD AUTO: 0.04 K/UL — SIGNIFICANT CHANGE UP (ref 0–0.2)
BASOPHILS NFR BLD AUTO: 0.6 % — SIGNIFICANT CHANGE UP (ref 0–2)
EOSINOPHIL # BLD AUTO: 0.31 K/UL — SIGNIFICANT CHANGE UP (ref 0–0.5)
EOSINOPHIL NFR BLD AUTO: 4.9 % — SIGNIFICANT CHANGE UP (ref 0–6)
HCT VFR BLD CALC: 36.7 % — SIGNIFICANT CHANGE UP (ref 34.5–45)
HGB BLD-MCNC: 12.3 G/DL — SIGNIFICANT CHANGE UP (ref 11.5–15.5)
IMM GRANULOCYTES NFR BLD AUTO: 0.2 % — SIGNIFICANT CHANGE UP (ref 0–0.9)
LYMPHOCYTES # BLD AUTO: 1.55 K/UL — SIGNIFICANT CHANGE UP (ref 1–3.3)
LYMPHOCYTES # BLD AUTO: 24.5 % — SIGNIFICANT CHANGE UP (ref 13–44)
MCHC RBC-ENTMCNC: 29.7 PG — SIGNIFICANT CHANGE UP (ref 27–34)
MCHC RBC-ENTMCNC: 33.5 G/DL — SIGNIFICANT CHANGE UP (ref 32–36)
MCV RBC AUTO: 88.6 FL — SIGNIFICANT CHANGE UP (ref 80–100)
MONOCYTES # BLD AUTO: 0.68 K/UL — SIGNIFICANT CHANGE UP (ref 0–0.9)
MONOCYTES NFR BLD AUTO: 10.7 % — SIGNIFICANT CHANGE UP (ref 2–14)
NEUTROPHILS # BLD AUTO: 3.74 K/UL — SIGNIFICANT CHANGE UP (ref 1.8–7.4)
NEUTROPHILS NFR BLD AUTO: 59.1 % — SIGNIFICANT CHANGE UP (ref 43–77)
NRBC # BLD: 0 /100 WBCS — SIGNIFICANT CHANGE UP (ref 0–0)
NRBC BLD-RTO: 0 /100 WBCS — SIGNIFICANT CHANGE UP (ref 0–0)
PLATELET # BLD AUTO: 322 K/UL — SIGNIFICANT CHANGE UP (ref 150–400)
RBC # BLD: 4.14 M/UL — SIGNIFICANT CHANGE UP (ref 3.8–5.2)
RBC # FLD: 12.9 % — SIGNIFICANT CHANGE UP (ref 10.3–14.5)
WBC # BLD: 6.33 K/UL — SIGNIFICANT CHANGE UP (ref 3.8–10.5)
WBC # FLD AUTO: 6.33 K/UL — SIGNIFICANT CHANGE UP (ref 3.8–10.5)

## 2025-02-04 PROCEDURE — 99215 OFFICE O/P EST HI 40 MIN: CPT

## 2025-02-05 ENCOUNTER — NON-APPOINTMENT (OUTPATIENT)
Age: 70
End: 2025-02-05

## 2025-02-05 LAB
ALBUMIN SERPL ELPH-MCNC: 4.1 G/DL
ALP BLD-CCNC: 89 U/L
ALT SERPL-CCNC: 16 U/L
ANION GAP SERPL CALC-SCNC: 13 MMOL/L
AST SERPL-CCNC: 20 U/L
BILIRUB SERPL-MCNC: 0.3 MG/DL
BUN SERPL-MCNC: 10 MG/DL
CALCIUM SERPL-MCNC: 9.8 MG/DL
CHLORIDE SERPL-SCNC: 100 MMOL/L
CO2 SERPL-SCNC: 29 MMOL/L
CREAT SERPL-MCNC: 0.76 MG/DL
EGFR: 85 ML/MIN/1.73M2
ERYTHROCYTE [SEDIMENTATION RATE] IN BLOOD BY WESTERGREN METHOD: 37 MM/HR
FERRITIN SERPL-MCNC: 83 NG/ML
FOLATE SERPL-MCNC: 12.2 NG/ML
GLUCOSE SERPL-MCNC: 93 MG/DL
HAPTOGLOB SERPL-MCNC: 170 MG/DL
IRON SATN MFR SERPL: 20 %
IRON SERPL-MCNC: 58 UG/DL
LDH SERPL-CCNC: 198 U/L
POTASSIUM SERPL-SCNC: 4.2 MMOL/L
PROT SERPL-MCNC: 7.2 G/DL
RBC # BLD: 4.03 M/UL
RETICS # AUTO: 1.5 %
RETICS AGGREG/RBC NFR: 60.9 K/UL
SODIUM SERPL-SCNC: 142 MMOL/L
TIBC SERPL-MCNC: 293 UG/DL
UIBC SERPL-MCNC: 235 UG/DL
VIT B12 SERPL-MCNC: 529 PG/ML

## 2025-02-11 LAB
ALBUMIN MFR SERPL ELPH: 55.9 %
ALBUMIN SERPL-MCNC: 4 G/DL
ALBUMIN/GLOB SERPL: 1.2 RATIO
ALPHA1 GLOB MFR SERPL ELPH: 4.1 %
ALPHA1 GLOB SERPL ELPH-MCNC: 0.3 G/DL
ALPHA2 GLOB MFR SERPL ELPH: 10.2 %
ALPHA2 GLOB SERPL ELPH-MCNC: 0.7 G/DL
B-GLOBULIN MFR SERPL ELPH: 13.6 %
B-GLOBULIN SERPL ELPH-MCNC: 1 G/DL
DEPRECATED KAPPA LC FREE/LAMBDA SER: 1.19 RATIO
GAMMA GLOB FLD ELPH-MCNC: 1.2 G/DL
GAMMA GLOB MFR SERPL ELPH: 16.2 %
IGA SER QL IEP: 418 MG/DL
IGG SER QL IEP: 1152 MG/DL
IGM SER QL IEP: 117 MG/DL
INTERPRETATION SERPL IEP-IMP: NORMAL
KAPPA LC CSF-MCNC: 2.75 MG/DL
KAPPA LC SERPL-MCNC: 3.27 MG/DL
M PROTEIN SPEC IFE-MCNC: NORMAL
PROT SERPL-MCNC: 7.2 G/DL
PROT SERPL-MCNC: 7.2 G/DL

## 2025-04-15 ENCOUNTER — RESULT REVIEW (OUTPATIENT)
Age: 70
End: 2025-04-15

## 2025-04-15 ENCOUNTER — APPOINTMENT (OUTPATIENT)
Dept: MAMMOGRAPHY | Facility: CLINIC | Age: 70
End: 2025-04-15
Payer: COMMERCIAL

## 2025-04-15 ENCOUNTER — APPOINTMENT (OUTPATIENT)
Dept: ULTRASOUND IMAGING | Facility: CLINIC | Age: 70
End: 2025-04-15
Payer: COMMERCIAL

## 2025-04-15 ENCOUNTER — OUTPATIENT (OUTPATIENT)
Dept: OUTPATIENT SERVICES | Facility: HOSPITAL | Age: 70
LOS: 1 days | End: 2025-04-15
Payer: COMMERCIAL

## 2025-04-15 DIAGNOSIS — C50.911 MALIGNANT NEOPLASM OF UNSPECIFIED SITE OF RIGHT FEMALE BREAST: ICD-10-CM

## 2025-04-15 DIAGNOSIS — R92.8 OTHER ABNORMAL AND INCONCLUSIVE FINDINGS ON DIAGNOSTIC IMAGING OF BREAST: ICD-10-CM

## 2025-04-15 DIAGNOSIS — Z00.8 ENCOUNTER FOR OTHER GENERAL EXAMINATION: ICD-10-CM

## 2025-04-15 PROCEDURE — 77067 SCR MAMMO BI INCL CAD: CPT | Mod: 26

## 2025-04-15 PROCEDURE — 77063 BREAST TOMOSYNTHESIS BI: CPT | Mod: 26

## 2025-04-15 PROCEDURE — 76641 ULTRASOUND BREAST COMPLETE: CPT | Mod: 26,50

## 2025-04-15 PROCEDURE — 77063 BREAST TOMOSYNTHESIS BI: CPT

## 2025-04-15 PROCEDURE — 76641 ULTRASOUND BREAST COMPLETE: CPT

## 2025-04-15 PROCEDURE — 77067 SCR MAMMO BI INCL CAD: CPT

## 2025-04-24 ENCOUNTER — NON-APPOINTMENT (OUTPATIENT)
Age: 70
End: 2025-04-24

## 2025-04-25 ENCOUNTER — APPOINTMENT (OUTPATIENT)
Dept: BREAST CENTER | Facility: CLINIC | Age: 70
End: 2025-04-25
Payer: COMMERCIAL

## 2025-04-25 VITALS
HEIGHT: 63 IN | DIASTOLIC BLOOD PRESSURE: 102 MMHG | HEART RATE: 84 BPM | SYSTOLIC BLOOD PRESSURE: 167 MMHG | WEIGHT: 239 LBS | BODY MASS INDEX: 42.35 KG/M2

## 2025-04-25 DIAGNOSIS — C50.911 MALIGNANT NEOPLASM OF UNSPECIFIED SITE OF RIGHT FEMALE BREAST: ICD-10-CM

## 2025-04-25 DIAGNOSIS — R92.8 OTHER ABNORMAL AND INCONCLUSIVE FINDINGS ON DIAGNOSTIC IMAGING OF BREAST: ICD-10-CM

## 2025-04-25 DIAGNOSIS — R23.4 CHANGES IN SKIN TEXTURE: ICD-10-CM

## 2025-04-25 PROCEDURE — 99213 OFFICE O/P EST LOW 20 MIN: CPT

## 2025-04-25 RX ORDER — INDOMETHACIN 25 MG
CAPSULE ORAL
Refills: 0 | Status: ACTIVE | COMMUNITY

## 2025-06-09 ENCOUNTER — OUTPATIENT (OUTPATIENT)
Dept: OUTPATIENT SERVICES | Facility: HOSPITAL | Age: 70
LOS: 1 days | Discharge: HOME | End: 2025-06-09

## 2025-06-09 DIAGNOSIS — Z08 ENCOUNTER FOR FOLLOW-UP EXAMINATION AFTER COMPLETED TREATMENT FOR MALIGNANT NEOPLASM: ICD-10-CM

## 2025-06-09 DIAGNOSIS — Z51.81 ENCOUNTER FOR THERAPEUTIC DRUG LEVEL MONITORING: ICD-10-CM

## 2025-06-09 DIAGNOSIS — D64.9 ANEMIA, UNSPECIFIED: ICD-10-CM

## 2025-06-09 DIAGNOSIS — C50.911 MALIGNANT NEOPLASM OF UNSPECIFIED SITE OF RIGHT FEMALE BREAST: ICD-10-CM

## 2025-06-10 ENCOUNTER — APPOINTMENT (OUTPATIENT)
Dept: HEMATOLOGY ONCOLOGY | Facility: CLINIC | Age: 70
End: 2025-06-10

## 2025-06-11 ENCOUNTER — RESULT REVIEW (OUTPATIENT)
Age: 70
End: 2025-06-11

## 2025-06-11 ENCOUNTER — APPOINTMENT (OUTPATIENT)
Dept: HEMATOLOGY ONCOLOGY | Facility: CLINIC | Age: 70
End: 2025-06-11

## 2025-06-11 LAB
BASOPHILS # BLD AUTO: 0.05 K/UL — SIGNIFICANT CHANGE UP (ref 0–0.2)
BASOPHILS NFR BLD AUTO: 0.5 % — SIGNIFICANT CHANGE UP (ref 0–2)
EOSINOPHIL # BLD AUTO: 0.34 K/UL — SIGNIFICANT CHANGE UP (ref 0–0.5)
EOSINOPHIL NFR BLD AUTO: 3.3 % — SIGNIFICANT CHANGE UP (ref 0–6)
ERYTHROCYTE [SEDIMENTATION RATE] IN BLOOD BY WESTERGREN METHOD: 50 MM/HR
FERRITIN SERPL-MCNC: 160 NG/ML
HCT VFR BLD CALC: 33.8 % — LOW (ref 34.5–45)
HGB BLD-MCNC: 11.8 G/DL — SIGNIFICANT CHANGE UP (ref 11.5–15.5)
IMM GRANULOCYTES NFR BLD AUTO: 0.3 % — SIGNIFICANT CHANGE UP (ref 0–0.9)
IRON SATN MFR SERPL: 9 %
IRON SERPL-MCNC: 22 UG/DL
LYMPHOCYTES # BLD AUTO: 1.93 K/UL — SIGNIFICANT CHANGE UP (ref 1–3.3)
LYMPHOCYTES # BLD AUTO: 18.8 % — SIGNIFICANT CHANGE UP (ref 13–44)
MCHC RBC-ENTMCNC: 30.4 PG — SIGNIFICANT CHANGE UP (ref 27–34)
MCHC RBC-ENTMCNC: 34.9 G/DL — SIGNIFICANT CHANGE UP (ref 32–36)
MCV RBC AUTO: 87.1 FL — SIGNIFICANT CHANGE UP (ref 80–100)
MONOCYTES # BLD AUTO: 0.98 K/UL — HIGH (ref 0–0.9)
MONOCYTES NFR BLD AUTO: 9.5 % — SIGNIFICANT CHANGE UP (ref 2–14)
NEUTROPHILS # BLD AUTO: 6.94 K/UL — SIGNIFICANT CHANGE UP (ref 1.8–7.4)
NEUTROPHILS NFR BLD AUTO: 67.6 % — SIGNIFICANT CHANGE UP (ref 43–77)
NRBC BLD AUTO-RTO: 0 /100 WBCS — SIGNIFICANT CHANGE UP (ref 0–0)
PLATELET # BLD AUTO: 338 K/UL — SIGNIFICANT CHANGE UP (ref 150–400)
RBC # BLD: 3.88 M/UL — SIGNIFICANT CHANGE UP (ref 3.8–5.2)
RBC # FLD: 12.7 % — SIGNIFICANT CHANGE UP (ref 10.3–14.5)
TIBC SERPL-MCNC: 263 UG/DL
UIBC SERPL-MCNC: 240 UG/DL
WBC # BLD: 10.27 K/UL — SIGNIFICANT CHANGE UP (ref 3.8–10.5)
WBC # FLD AUTO: 10.27 K/UL — SIGNIFICANT CHANGE UP (ref 3.8–10.5)

## 2025-06-30 ENCOUNTER — APPOINTMENT (OUTPATIENT)
Dept: HEMATOLOGY ONCOLOGY | Facility: CLINIC | Age: 70
End: 2025-06-30
Payer: COMMERCIAL

## 2025-06-30 VITALS
BODY MASS INDEX: 39.16 KG/M2 | OXYGEN SATURATION: 97 % | HEART RATE: 64 BPM | RESPIRATION RATE: 18 BRPM | HEIGHT: 62.99 IN | SYSTOLIC BLOOD PRESSURE: 135 MMHG | WEIGHT: 221 LBS | TEMPERATURE: 97.9 F | DIASTOLIC BLOOD PRESSURE: 83 MMHG

## 2025-06-30 PROCEDURE — 99213 OFFICE O/P EST LOW 20 MIN: CPT

## 2025-06-30 PROCEDURE — G2211 COMPLEX E/M VISIT ADD ON: CPT | Mod: NC

## 2025-07-06 PROBLEM — E61.1 IRON DEFICIENCY: Status: ACTIVE | Noted: 2025-07-06

## 2025-07-06 RX ORDER — TIRZEPATIDE 15 MG/.5ML
INJECTION, SOLUTION SUBCUTANEOUS
Refills: 0 | Status: ACTIVE | COMMUNITY

## 2025-07-07 DIAGNOSIS — E61.1 IRON DEFICIENCY: ICD-10-CM

## 2025-08-11 ENCOUNTER — APPOINTMENT (OUTPATIENT)
Dept: INFUSION THERAPY | Facility: CLINIC | Age: 70
End: 2025-08-11